# Patient Record
Sex: FEMALE | Race: OTHER | HISPANIC OR LATINO | URBAN - METROPOLITAN AREA
[De-identification: names, ages, dates, MRNs, and addresses within clinical notes are randomized per-mention and may not be internally consistent; named-entity substitution may affect disease eponyms.]

---
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
## 2025-06-28 ENCOUNTER — EMERGENCY (EMERGENCY)
Facility: HOSPITAL | Age: 20
LOS: 1 days | End: 2025-06-28
Attending: EMERGENCY MEDICINE
Payer: COMMERCIAL

## 2025-06-28 VITALS
OXYGEN SATURATION: 99 % | SYSTOLIC BLOOD PRESSURE: 126 MMHG | DIASTOLIC BLOOD PRESSURE: 85 MMHG | HEART RATE: 72 BPM | TEMPERATURE: 98 F | RESPIRATION RATE: 18 BRPM | WEIGHT: 160.94 LBS | HEIGHT: 66 IN

## 2025-06-28 VITALS
HEART RATE: 67 BPM | TEMPERATURE: 100 F | OXYGEN SATURATION: 98 % | RESPIRATION RATE: 18 BRPM | SYSTOLIC BLOOD PRESSURE: 121 MMHG | DIASTOLIC BLOOD PRESSURE: 75 MMHG

## 2025-06-28 LAB
ALBUMIN SERPL ELPH-MCNC: 3.4 G/DL — LOW (ref 3.5–5)
ALP SERPL-CCNC: 53 U/L — SIGNIFICANT CHANGE UP (ref 40–120)
ALT FLD-CCNC: 17 U/L DA — SIGNIFICANT CHANGE UP (ref 10–60)
ANION GAP SERPL CALC-SCNC: 7 MMOL/L — SIGNIFICANT CHANGE UP (ref 5–17)
AST SERPL-CCNC: 20 U/L — SIGNIFICANT CHANGE UP (ref 10–40)
BASOPHILS # BLD AUTO: 0.05 K/UL — SIGNIFICANT CHANGE UP (ref 0–0.2)
BASOPHILS NFR BLD AUTO: 0.3 % — SIGNIFICANT CHANGE UP (ref 0–2)
BILIRUB SERPL-MCNC: 0.8 MG/DL — SIGNIFICANT CHANGE UP (ref 0.2–1.2)
BUN SERPL-MCNC: 6 MG/DL — LOW (ref 7–18)
CALCIUM SERPL-MCNC: 8.7 MG/DL — SIGNIFICANT CHANGE UP (ref 8.4–10.5)
CHLORIDE SERPL-SCNC: 106 MMOL/L — SIGNIFICANT CHANGE UP (ref 96–108)
CO2 SERPL-SCNC: 24 MMOL/L — SIGNIFICANT CHANGE UP (ref 22–31)
CREAT SERPL-MCNC: 0.75 MG/DL — SIGNIFICANT CHANGE UP (ref 0.5–1.3)
EGFR: 117 ML/MIN/1.73M2 — SIGNIFICANT CHANGE UP
EGFR: 117 ML/MIN/1.73M2 — SIGNIFICANT CHANGE UP
EOSINOPHIL # BLD AUTO: 0.18 K/UL — SIGNIFICANT CHANGE UP (ref 0–0.5)
EOSINOPHIL NFR BLD AUTO: 1.1 % — SIGNIFICANT CHANGE UP (ref 0–6)
GLUCOSE SERPL-MCNC: 94 MG/DL — SIGNIFICANT CHANGE UP (ref 70–99)
HCG SERPL-ACNC: <1 MIU/ML — SIGNIFICANT CHANGE UP
HCT VFR BLD CALC: 37.3 % — SIGNIFICANT CHANGE UP (ref 34.5–45)
HGB BLD-MCNC: 12.2 G/DL — SIGNIFICANT CHANGE UP (ref 11.5–15.5)
IMM GRANULOCYTES NFR BLD AUTO: 0.3 % — SIGNIFICANT CHANGE UP (ref 0–0.9)
LYMPHOCYTES # BLD AUTO: 1.59 K/UL — SIGNIFICANT CHANGE UP (ref 1–3.3)
LYMPHOCYTES # BLD AUTO: 10.1 % — LOW (ref 13–44)
MCHC RBC-ENTMCNC: 29.1 PG — SIGNIFICANT CHANGE UP (ref 27–34)
MCHC RBC-ENTMCNC: 32.7 G/DL — SIGNIFICANT CHANGE UP (ref 32–36)
MCV RBC AUTO: 89 FL — SIGNIFICANT CHANGE UP (ref 80–100)
MONOCYTES # BLD AUTO: 1.42 K/UL — HIGH (ref 0–0.9)
MONOCYTES NFR BLD AUTO: 9 % — SIGNIFICANT CHANGE UP (ref 2–14)
NEUTROPHILS # BLD AUTO: 12.41 K/UL — HIGH (ref 1.8–7.4)
NEUTROPHILS NFR BLD AUTO: 79.2 % — HIGH (ref 43–77)
NRBC BLD AUTO-RTO: 0 /100 WBCS — SIGNIFICANT CHANGE UP (ref 0–0)
PLATELET # BLD AUTO: 271 K/UL — SIGNIFICANT CHANGE UP (ref 150–400)
POTASSIUM SERPL-MCNC: 3.8 MMOL/L — SIGNIFICANT CHANGE UP (ref 3.5–5.3)
POTASSIUM SERPL-SCNC: 3.8 MMOL/L — SIGNIFICANT CHANGE UP (ref 3.5–5.3)
PROT SERPL-MCNC: 7.1 G/DL — SIGNIFICANT CHANGE UP (ref 6–8.3)
RBC # BLD: 4.19 M/UL — SIGNIFICANT CHANGE UP (ref 3.8–5.2)
RBC # FLD: 13.6 % — SIGNIFICANT CHANGE UP (ref 10.3–14.5)
SODIUM SERPL-SCNC: 137 MMOL/L — SIGNIFICANT CHANGE UP (ref 135–145)
WBC # BLD: 15.7 K/UL — HIGH (ref 3.8–10.5)
WBC # FLD AUTO: 15.7 K/UL — HIGH (ref 3.8–10.5)

## 2025-06-28 PROCEDURE — 84702 CHORIONIC GONADOTROPIN TEST: CPT

## 2025-06-28 PROCEDURE — 36415 COLL VENOUS BLD VENIPUNCTURE: CPT

## 2025-06-28 PROCEDURE — 80053 COMPREHEN METABOLIC PANEL: CPT

## 2025-06-28 PROCEDURE — 96375 TX/PRO/DX INJ NEW DRUG ADDON: CPT | Mod: XU

## 2025-06-28 PROCEDURE — 99285 EMERGENCY DEPT VISIT HI MDM: CPT

## 2025-06-28 PROCEDURE — 85025 COMPLETE CBC W/AUTO DIFF WBC: CPT

## 2025-06-28 PROCEDURE — 99285 EMERGENCY DEPT VISIT HI MDM: CPT | Mod: 25

## 2025-06-28 PROCEDURE — 70481 CT ORBIT/EAR/FOSSA W/DYE: CPT

## 2025-06-28 PROCEDURE — 70481 CT ORBIT/EAR/FOSSA W/DYE: CPT | Mod: 26

## 2025-06-28 PROCEDURE — 96374 THER/PROPH/DIAG INJ IV PUSH: CPT | Mod: XU

## 2025-06-28 RX ORDER — PIPERACILLIN-TAZO-DEXTROSE,ISO 3.375G/5
3.38 IV SOLUTION, PIGGYBACK PREMIX FROZEN(ML) INTRAVENOUS ONCE
Refills: 0 | Status: COMPLETED | OUTPATIENT
Start: 2025-06-28 | End: 2025-06-28

## 2025-06-28 RX ORDER — KETOROLAC TROMETHAMINE 30 MG/ML
15 INJECTION, SOLUTION INTRAMUSCULAR; INTRAVENOUS ONCE
Refills: 0 | Status: DISCONTINUED | OUTPATIENT
Start: 2025-06-28 | End: 2025-06-28

## 2025-06-28 RX ADMIN — KETOROLAC TROMETHAMINE 15 MILLIGRAM(S): 30 INJECTION, SOLUTION INTRAMUSCULAR; INTRAVENOUS at 20:47

## 2025-06-28 RX ADMIN — Medication 200 GRAM(S): at 23:41

## 2025-06-28 RX ADMIN — KETOROLAC TROMETHAMINE 15 MILLIGRAM(S): 30 INJECTION, SOLUTION INTRAMUSCULAR; INTRAVENOUS at 21:17

## 2025-06-28 NOTE — ED PROVIDER NOTE - ATTENDING APP SHARED VISIT CONTRIBUTION OF CARE
19 year old F with right ear pain x 2 days.  CT reported R otitis externa and findings suggestive of right TMJ septic arthritis.  Pt required transfer to [facility] for ENT intervention  CLAUDE Hensley had a detailed discussion with the patient and/or guardian regarding the historical points, exam findings, and any diagnostic results supporting the transfer diagnosis. 19 year old F with right ear pain x 2 days.  Right ear canal edematous, + discharge, tenderness  CT reported R otitis externa and findings suggestive of right TMJ septic arthritis.  Pt required transfer to [facility] for ENT intervention  CLAUDE Hensley had a detailed discussion with the patient and/or guardian regarding the historical points, exam findings, and any diagnostic results supporting the transfer diagnosis.

## 2025-06-28 NOTE — ED ADULT NURSE NOTE - TEMPLATE LIST FOR HEAD TO TOE ASSESSMENT
EENMT Picato Counseling:  I discussed with the patient the risks of Picato including but not limited to erythema, scaling, itching, weeping, crusting, and pain.

## 2025-06-28 NOTE — ED PROVIDER NOTE - PROGRESS NOTE DETAILS
CT shows right otitis externa and also concerns for a septic joint of the right TMJ.  Spoke with ENT, Dr. Asher, via the CTC who will accept transfer to the emergency room at [facility].  Verbal consult by the [facility] to OMFS was performed, CLAUDE Hensley was not on the line at the time, may need to additionally consult in the ER upon arrival.

## 2025-06-28 NOTE — ED PROVIDER NOTE - OBJECTIVE STATEMENT
19-year-old female with no past medical history presents with right ear pain for 2 days.  Went to urgent care yesterday where she was diagnosed with otitis media and otitis externa.  Was given ciprofloxacin (has taken 3 doses) and Polytrim.  Patient reports that since then she has had worsening pain, swelling behind the ear, redness behind the ear.  In addition to the antibiotic she has been taking Tylenol, Motrin and Excedrin for pain. Denies fevers. 19-year-old female with no past medical history presents with right ear pain for 2 days.  Went to urgent care yesterday where she was diagnosed with otitis media and otitis externa.  Was given ciprofloxacin (has taken 3 doses) and Polytrim.  Patient reports that since then she has had worsening pain, swelling behind the ear, redness behind the ear. Patient reports the pain is radiating to her jaw, causing pain with chewing and moving of mouth.  In addition to the antibiotic she has been taking Tylenol, Motrin and Excedrin for pain. Denies fevers.

## 2025-06-28 NOTE — ED PROVIDER NOTE - PHYSICAL EXAMINATION
Right ear - +Swelling, erythema and TTP of the mastoid. +outpouching of the auricle. Auricle and tragal tenderness. External canal edematous with yellow discharge, unable to visualise inner ear due to swelling.     Left ear - eczema noted in canal. TM intact. No mastoid tenderness.

## 2025-06-28 NOTE — ED ADULT TRIAGE NOTE - CHIEF COMPLAINT QUOTE
Pt  reports that  she has Right  ear pain started 2 days ago . treated by [facility] with antibiotics and ear drops yesterday .   Pt c/o of severe pain  radiating in the right lower jaw  and right side head   and yellowish discharge

## 2025-06-28 NOTE — ED PROVIDER NOTE - CLINICAL SUMMARY MEDICAL DECISION MAKING FREE TEXT BOX
19-year-old female with no past medical history presents with right ear pain for 2 days.  Went to urgent care yesterday where she was diagnosed with otitis media and otitis externa.  Was given ciprofloxacin (has taken 3 doses) and Polytrim.  Patient reports that since then she has had worsening pain, swelling behind the ear, redness behind the ear.  In addition to the antibiotic she has been taking Tylenol, Motrin and Excedrin for pain. Denies fevers.     Physical exam as above. Will obtain labs and obtain CT to r/o mastoiditis. 19-year-old female with no past medical history presents with right ear pain for 2 days.  Went to urgent care yesterday where she was diagnosed with otitis media and otitis externa.  Was given ciprofloxacin (has taken 3 doses) and Polytrim.  Patient reports that since then she has had worsening pain, swelling behind the ear, redness behind the ear. Patient reports the pain is radiating to her jaw, causing pain with chewing and moving of mouth.  In addition to the antibiotic she has been taking Tylenol, Motrin and Excedrin for pain. Denies fevers.    Physical exam as above. Will obtain labs and obtain CT to r/o mastoiditis.

## 2025-06-29 ENCOUNTER — INPATIENT (INPATIENT)
Facility: HOSPITAL | Age: 20
LOS: 2 days | Discharge: HOME | End: 2025-07-02
Attending: HOSPITALIST | Admitting: HOSPITALIST
Payer: COMMERCIAL

## 2025-06-29 VITALS
HEIGHT: 66 IN | SYSTOLIC BLOOD PRESSURE: 131 MMHG | HEART RATE: 57 BPM | DIASTOLIC BLOOD PRESSURE: 83 MMHG | RESPIRATION RATE: 18 BRPM | WEIGHT: 186.07 LBS | TEMPERATURE: 98 F | OXYGEN SATURATION: 99 %

## 2025-06-29 DIAGNOSIS — E87.1 HYPO-OSMOLALITY AND HYPONATREMIA: ICD-10-CM

## 2025-06-29 DIAGNOSIS — Z29.9 ENCOUNTER FOR PROPHYLACTIC MEASURES, UNSPECIFIED: ICD-10-CM

## 2025-06-29 DIAGNOSIS — H66.90 OTITIS MEDIA, UNSPECIFIED, UNSPECIFIED EAR: ICD-10-CM

## 2025-06-29 DIAGNOSIS — M26.69 OTHER SPECIFIED DISORDERS OF TEMPOROMANDIBULAR JOINT: ICD-10-CM

## 2025-06-29 DIAGNOSIS — H60.90 UNSPECIFIED OTITIS EXTERNA, UNSPECIFIED EAR: ICD-10-CM

## 2025-06-29 LAB
ALBUMIN SERPL ELPH-MCNC: 3.4 G/DL — SIGNIFICANT CHANGE UP (ref 3.3–5)
ALP SERPL-CCNC: 50 U/L — SIGNIFICANT CHANGE UP (ref 40–120)
ALT FLD-CCNC: 10 U/L — SIGNIFICANT CHANGE UP (ref 4–33)
ANION GAP SERPL CALC-SCNC: 13 MMOL/L — SIGNIFICANT CHANGE UP (ref 7–14)
ANION GAP SERPL CALC-SCNC: 14 MMOL/L — SIGNIFICANT CHANGE UP (ref 7–14)
AST SERPL-CCNC: 16 U/L — SIGNIFICANT CHANGE UP (ref 4–32)
BASOPHILS # BLD AUTO: 0.04 K/UL — SIGNIFICANT CHANGE UP (ref 0–0.2)
BASOPHILS NFR BLD AUTO: 0.3 % — SIGNIFICANT CHANGE UP (ref 0–2)
BILIRUB SERPL-MCNC: 0.6 MG/DL — SIGNIFICANT CHANGE UP (ref 0.2–1.2)
BUN SERPL-MCNC: 6 MG/DL — LOW (ref 7–23)
BUN SERPL-MCNC: 7 MG/DL — SIGNIFICANT CHANGE UP (ref 7–23)
CALCIUM SERPL-MCNC: 7.7 MG/DL — LOW (ref 8.4–10.5)
CALCIUM SERPL-MCNC: 8.6 MG/DL — SIGNIFICANT CHANGE UP (ref 8.4–10.5)
CHLORIDE SERPL-SCNC: 107 MMOL/L — SIGNIFICANT CHANGE UP (ref 98–107)
CHLORIDE SERPL-SCNC: 94 MMOL/L — LOW (ref 98–107)
CO2 SERPL-SCNC: 16 MMOL/L — LOW (ref 22–31)
CO2 SERPL-SCNC: 17 MMOL/L — LOW (ref 22–31)
CREAT SERPL-MCNC: 0.58 MG/DL — SIGNIFICANT CHANGE UP (ref 0.5–1.3)
CREAT SERPL-MCNC: 0.7 MG/DL — SIGNIFICANT CHANGE UP (ref 0.5–1.3)
CRP SERPL-MCNC: 52.9 MG/L — HIGH
EGFR: 127 ML/MIN/1.73M2 — SIGNIFICANT CHANGE UP
EGFR: 127 ML/MIN/1.73M2 — SIGNIFICANT CHANGE UP
EGFR: 133 ML/MIN/1.73M2 — SIGNIFICANT CHANGE UP
EGFR: 133 ML/MIN/1.73M2 — SIGNIFICANT CHANGE UP
EOSINOPHIL # BLD AUTO: 0.27 K/UL — SIGNIFICANT CHANGE UP (ref 0–0.5)
EOSINOPHIL NFR BLD AUTO: 1.9 % — SIGNIFICANT CHANGE UP (ref 0–6)
ERYTHROCYTE [SEDIMENTATION RATE] IN BLOOD: 34 MM/HR — HIGH (ref 0–20)
GLUCOSE SERPL-MCNC: 67 MG/DL — LOW (ref 70–99)
GLUCOSE SERPL-MCNC: 89 MG/DL — SIGNIFICANT CHANGE UP (ref 70–99)
HCT VFR BLD CALC: 34.7 % — SIGNIFICANT CHANGE UP (ref 34.5–45)
HGB BLD-MCNC: 11.4 G/DL — LOW (ref 11.5–15.5)
IMM GRANULOCYTES # BLD AUTO: 0.04 K/UL — SIGNIFICANT CHANGE UP (ref 0–0.07)
IMM GRANULOCYTES NFR BLD AUTO: 0.3 % — SIGNIFICANT CHANGE UP (ref 0–0.9)
LYMPHOCYTES # BLD AUTO: 1.83 K/UL — SIGNIFICANT CHANGE UP (ref 1–3.3)
LYMPHOCYTES NFR BLD AUTO: 12.6 % — LOW (ref 13–44)
MCHC RBC-ENTMCNC: 28.7 PG — SIGNIFICANT CHANGE UP (ref 27–34)
MCHC RBC-ENTMCNC: 32.9 G/DL — SIGNIFICANT CHANGE UP (ref 32–36)
MCV RBC AUTO: 87.4 FL — SIGNIFICANT CHANGE UP (ref 80–100)
MONOCYTES # BLD AUTO: 1.16 K/UL — HIGH (ref 0–0.9)
MONOCYTES NFR BLD AUTO: 8 % — SIGNIFICANT CHANGE UP (ref 2–14)
MRSA PCR RESULT.: SIGNIFICANT CHANGE UP
NEUTROPHILS # BLD AUTO: 11.18 K/UL — HIGH (ref 1.8–7.4)
NEUTROPHILS NFR BLD AUTO: 76.9 % — SIGNIFICANT CHANGE UP (ref 43–77)
NRBC # BLD AUTO: 0 K/UL — SIGNIFICANT CHANGE UP (ref 0–0)
NRBC # FLD: 0 K/UL — SIGNIFICANT CHANGE UP (ref 0–0)
NRBC BLD AUTO-RTO: 0 /100 WBCS — SIGNIFICANT CHANGE UP (ref 0–0)
PLATELET # BLD AUTO: 279 K/UL — SIGNIFICANT CHANGE UP (ref 150–400)
PMV BLD: 11 FL — SIGNIFICANT CHANGE UP (ref 7–13)
POTASSIUM SERPL-MCNC: 3.5 MMOL/L — SIGNIFICANT CHANGE UP (ref 3.5–5.3)
POTASSIUM SERPL-MCNC: 4.2 MMOL/L — SIGNIFICANT CHANGE UP (ref 3.5–5.3)
POTASSIUM SERPL-SCNC: 3.5 MMOL/L — SIGNIFICANT CHANGE UP (ref 3.5–5.3)
POTASSIUM SERPL-SCNC: 4.2 MMOL/L — SIGNIFICANT CHANGE UP (ref 3.5–5.3)
PROT SERPL-MCNC: 6.3 G/DL — SIGNIFICANT CHANGE UP (ref 6–8.3)
RBC # BLD: 3.97 M/UL — SIGNIFICANT CHANGE UP (ref 3.8–5.2)
RBC # FLD: 13.6 % — SIGNIFICANT CHANGE UP (ref 10.3–14.5)
S AUREUS DNA NOSE QL NAA+PROBE: SIGNIFICANT CHANGE UP
SODIUM SERPL-SCNC: 124 MMOL/L — LOW (ref 135–145)
SODIUM SERPL-SCNC: 137 MMOL/L — SIGNIFICANT CHANGE UP (ref 135–145)
WBC # BLD: 14.52 K/UL — HIGH (ref 3.8–10.5)
WBC # FLD AUTO: 14.52 K/UL — HIGH (ref 3.8–10.5)

## 2025-06-29 PROCEDURE — 99223 1ST HOSP IP/OBS HIGH 75: CPT

## 2025-06-29 PROCEDURE — 70487 CT MAXILLOFACIAL W/DYE: CPT | Mod: 26

## 2025-06-29 PROCEDURE — 99285 EMERGENCY DEPT VISIT HI MDM: CPT | Mod: 25

## 2025-06-29 RX ORDER — KETOROLAC TROMETHAMINE 30 MG/ML
15 INJECTION, SOLUTION INTRAMUSCULAR; INTRAVENOUS EVERY 6 HOURS
Refills: 0 | Status: DISCONTINUED | OUTPATIENT
Start: 2025-06-29 | End: 2025-07-01

## 2025-06-29 RX ORDER — LORAZEPAM 4 MG/ML
1 VIAL (ML) INJECTION ONCE
Refills: 0 | Status: DISCONTINUED | OUTPATIENT
Start: 2025-06-29 | End: 2025-06-29

## 2025-06-29 RX ORDER — NEOMYCIN SULFATE 87.5 MG/5ML
0 SOLUTION ORAL
Refills: 0 | DISCHARGE

## 2025-06-29 RX ORDER — PIPERACILLIN-TAZO-DEXTROSE,ISO 3.375G/5
3.38 IV SOLUTION, PIGGYBACK PREMIX FROZEN(ML) INTRAVENOUS ONCE
Refills: 0 | Status: COMPLETED | OUTPATIENT
Start: 2025-06-29 | End: 2025-06-29

## 2025-06-29 RX ORDER — ONDANSETRON HCL/PF 4 MG/2 ML
4 VIAL (ML) INJECTION EVERY 8 HOURS
Refills: 0 | Status: DISCONTINUED | OUTPATIENT
Start: 2025-06-29 | End: 2025-07-02

## 2025-06-29 RX ORDER — ACETAMINOPHEN 500 MG/5ML
650 LIQUID (ML) ORAL EVERY 6 HOURS
Refills: 0 | Status: DISCONTINUED | OUTPATIENT
Start: 2025-06-29 | End: 2025-07-02

## 2025-06-29 RX ORDER — ACETAMINOPHEN 500 MG/5ML
1000 LIQUID (ML) ORAL ONCE
Refills: 0 | Status: COMPLETED | OUTPATIENT
Start: 2025-06-29 | End: 2025-06-29

## 2025-06-29 RX ORDER — KETOROLAC TROMETHAMINE 30 MG/ML
15 INJECTION, SOLUTION INTRAMUSCULAR; INTRAVENOUS ONCE
Refills: 0 | Status: DISCONTINUED | OUTPATIENT
Start: 2025-06-29 | End: 2025-06-29

## 2025-06-29 RX ORDER — MAGNESIUM, ALUMINUM HYDROXIDE 200-200 MG
30 TABLET,CHEWABLE ORAL EVERY 4 HOURS
Refills: 0 | Status: DISCONTINUED | OUTPATIENT
Start: 2025-06-29 | End: 2025-07-02

## 2025-06-29 RX ORDER — HYDROMORPHONE/SOD CHLOR,ISO/PF 2 MG/10 ML
0.5 SYRINGE (ML) INJECTION ONCE
Refills: 0 | Status: DISCONTINUED | OUTPATIENT
Start: 2025-06-29 | End: 2025-06-29

## 2025-06-29 RX ORDER — MELATONIN 5 MG
3 TABLET ORAL AT BEDTIME
Refills: 0 | Status: DISCONTINUED | OUTPATIENT
Start: 2025-06-29 | End: 2025-07-02

## 2025-06-29 RX ORDER — PIPERACILLIN-TAZO-DEXTROSE,ISO 3.375G/5
3.38 IV SOLUTION, PIGGYBACK PREMIX FROZEN(ML) INTRAVENOUS EVERY 8 HOURS
Refills: 0 | Status: DISCONTINUED | OUTPATIENT
Start: 2025-06-29 | End: 2025-07-02

## 2025-06-29 RX ADMIN — Medication 1000 MILLILITER(S): at 12:23

## 2025-06-29 RX ADMIN — Medication 1000 MILLIGRAM(S): at 09:26

## 2025-06-29 RX ADMIN — Medication 400 MILLIGRAM(S): at 21:11

## 2025-06-29 RX ADMIN — Medication 0.5 MILLIGRAM(S): at 13:30

## 2025-06-29 RX ADMIN — Medication 5 DROP(S): at 17:46

## 2025-06-29 RX ADMIN — Medication 25 GRAM(S): at 17:41

## 2025-06-29 RX ADMIN — Medication 400 MILLIGRAM(S): at 09:22

## 2025-06-29 RX ADMIN — Medication 200 GRAM(S): at 06:33

## 2025-06-29 RX ADMIN — KETOROLAC TROMETHAMINE 15 MILLIGRAM(S): 30 INJECTION, SOLUTION INTRAMUSCULAR; INTRAVENOUS at 06:22

## 2025-06-29 RX ADMIN — Medication 0.5 MILLIGRAM(S): at 13:47

## 2025-06-29 RX ADMIN — KETOROLAC TROMETHAMINE 15 MILLIGRAM(S): 30 INJECTION, SOLUTION INTRAMUSCULAR; INTRAVENOUS at 15:55

## 2025-06-29 RX ADMIN — KETOROLAC TROMETHAMINE 15 MILLIGRAM(S): 30 INJECTION, SOLUTION INTRAMUSCULAR; INTRAVENOUS at 04:24

## 2025-06-29 RX ADMIN — Medication 1000 MILLIGRAM(S): at 22:11

## 2025-06-29 NOTE — H&P ADULT - NSHPLABSRESULTS_GEN_ALL_CORE
LABS:                        11.4   14.52 )-----------( 279      ( 29 Jun 2025 12:37 )             34.7     06-29    137  |  107  |  6[L]  ----------------------------<  89  4.2   |  17[L]  |  0.58    Ca    8.6      29 Jun 2025 16:38    TPro  6.3  /  Alb  3.4  /  TBili  0.6  /  DBili  x   /  AST  16  /  ALT  10  /  AlkPhos  50  06-29 06-29 @ 12:37 ESR 34 / CRP 52.9      CT IAC  IMPRESSION:  CT findings consistent with right otitis externa.  No discrete abscess or   drainable fluid collection is identified by CT technique.    There is asymmetric subluxation of the right temporomandibular joint with   asymmetric hyperattenuation in the right temporomandibular joint, which   raises the possibility of septic arthritis.    No gross CT evidence for intracranial spread of infection.    Follow-up contrast enhanced MRI may be obtained for more sensitive   evaluation.    CT maxillofacial     1. Redemonstration of right-sided otitis externa and right-sided   otomastoiditis. No associated bony or ossicular chain destruction.  2. Localized inflammatory changes tracking into the right TMJ space   without bony destruction. Right-sided TMJ septic arthritis cannot be   excluded.  3. Reactive right-sided parotiditis.  4. Reactively enlarged bilateral cervical lymph nodes.

## 2025-06-29 NOTE — ED ADULT NURSE REASSESSMENT NOTE - NS ED NURSE REASSESS COMMENT FT1
Pt is A+O4, resting in stretcher. Spontaneous respirations are even and unlabored on room air, no acute distress noted. Pt denies pain at this time. Awaiting transport.

## 2025-06-29 NOTE — ED ADULT NURSE REASSESSMENT NOTE - NS ED NURSE REASSESS COMMENT FT1
received report from nite shift/  pt seen by ent  pt to have repeat cat  scan  pt given pain med as ordered  family at bedside

## 2025-06-29 NOTE — H&P ADULT - PROBLEM SELECTOR PLAN 2
r/o septic TMJ joint  - ESR 34, CRP 52  - f/u blood cx (s/p zosyn)  - check HIV, GC chlamydia   - c/w zosyn  - MRI TMJ pending (advised pt will need to remove piercing, amenable), called rads resident and MRI tech advised want w/ contrast, rads resident to protocol w/ contrast as order does not specify  - soft diet

## 2025-06-29 NOTE — ED ADULT TRIAGE NOTE - CHIEF COMPLAINT QUOTE
[facility]. c/o R sided jaw pain x 4 days. CT+ for R TMJ septic arthritis. denies Hx

## 2025-06-29 NOTE — ED PROVIDER NOTE - PROGRESS NOTE DETAILS
Patient is due for second dose of antibiotics and was given Zosyn while awaiting ENT evaluation.  Patient required more pain medication.  Patient assessed after and pain has improved after second dose.  ENT consulted again at 6 AM and they should be arriving shortly. Attending MD Gonzalez.  Pt signed out to me in stable condition pending ENT recs, 20 yo fem with otitis externa/media 2d ago, on abxs with persistent severe pain/swelling, CT ext aud with sig ext otitis externa, poss septic arthritis of R TMJ. Jono Galicia PGY3 MD  Spoke with OMFS.  At this time they are recommending admission for IV antibiotics.  Recommends medicine admission.  Possible MRI.  No acute surgical intervention at this time.

## 2025-06-29 NOTE — H&P ADULT - HISTORY OF PRESENT ILLNESS
19F smoker no medical history, prior otitis externa who presents w/ worsening R ear pain.  Patient reports went to beach on 6/23, swimming, water did get into ear.  On 6/25 noted R ear was a bit bothersome, got OTC ear meds at local pharmacy, by 6/26 pain was worsening, with drainage and swelling and issues with mouth movement, pain, limiting PO.  On 6/27 went to urgent care given neomycin/polymyxin/hydrocort and PO cipro took all of Friday and Sat am.  Friday with abx increased nausea/vomiting unable to keep PO down.  On 6/28 pain worsening and presented to ED at  and noted to have otitis externa and on CT concern for septic TMJ transferred to [facility] for OMFS and ENT eval.    ENT saw-->ciprodex 5 gtt BID x 10 d  OMFS--> abx and MRI TMJ    AVSS, labs with leukocytosis.

## 2025-06-29 NOTE — CONSULT NOTE ADULT - ASSESSMENT
19F w/ no pmhx presents as a transfer from [facility] ED to [facility] for ENT eval. Repeat max face shows Patient with otitis media and otitis externa, repeat w/  localizing inflammatory changes that tracks into the right TMJ space, no bony destruction or collection appreciated.     Plan:   In discussion w/ attending 19F w/ no pmhx [facility] for ENT eval. Repeat max face shows Patient with otitis media and otitis externa, repeat w/  localizing inflammatory changes that tracks into the right TMJ space, no bony destruction or collection appreciated.     Plan:   No acute OMFS intervention  Blood culture   STD panel   C/w IV abx   F/u ENT reccs  Recc Medicine admission for leukocytosis and IV abx  Recc MRI to eval TMJ

## 2025-06-29 NOTE — H&P ADULT - NSHPPHYSICALEXAM_GEN_ALL_CORE
T(C): 37.4 (06-29-25 @ 16:08), Max: 37.8 (06-28-25 @ 23:33)  HR: 62 (06-29-25 @ 16:08) (52 - 72)  BP: 144/69 (06-29-25 @ 16:08) (111/68 - 144/69)  RR: 17 (06-29-25 @ 16:08) (16 - 18)  SpO2: 100% (06-29-25 @ 16:08) (98% - 100%)    PHYSICAL EXAM:  GENERAL: NAD, well-developed  HEAD:  Atraumatic, Normocephalic  HEENT: R ear drainage, TTP of mastoid and TMJ  EYES: EOMI, PERRLA, conjunctiva and sclera clear  NECK: Supple, no JVD  CHEST/LUNG: Clear to auscultation bilaterally; no wheeze  HEART: Regular rate and rhythm; no murmurs, rubs, or gallops  ABDOMEN: Soft, Nontender, Nondistended; Bowel sounds present  EXTREMITIES:  warm and well perfused, no clubbing, cyanosis, or edema  PSYCH: AAOx3  NEUROLOGY: non-focal  SKIN: no rashes or lesions

## 2025-06-29 NOTE — ED PROVIDER NOTE - CLINICAL SUMMARY MEDICAL DECISION MAKING FREE TEXT BOX
19-year-old female with otitis externa and possible septic arthritis of the TMJ questionable mastoiditis on CT.  Patient has improved pain after antibiotics.  Will continue to monitor vital signs and ENT consult is pending. ENT called at 2:15 AM.

## 2025-06-29 NOTE — ED ADULT NURSE NOTE - CHIEF COMPLAINT QUOTE
arrives as transfer from [facility] for ENT consult. c/o R sided jaw pain x 4 days. CT+ for R TMJ septic arthritis. denies Hx

## 2025-06-29 NOTE — ED ADULT TRIAGE NOTE - HEIGHT IN INCHES
"Encounter Date: 2/15/2020    SCRIBE #1 NOTE: I, Jeanine Boggs, am scribing for, and in the presence of,  Dr. Ahumada. I have scribed the following portions of the note - Other sections scribed: HPI, ROS, PE.       History     Chief Complaint   Patient presents with    Nausea     nausea, abdominal pain, chills that started today.     Abdominal Pain     Diana Arriaga is a 45 y.o. female with osteopenia, interstitial cystitis, and anxiety and with history of migraine headaches who presents to the ED complaining of nausea and throbbing, spasms, tightening abdominal pain that started toady.  Patient is also complains of dysuria and thick, white, liquid-like vaginal discharge that started yesterday. Patient is allergic to robaxin, ciprofloxacin, trazodone, Zofran, vistaril, and adhesive.  No recent sexual intercourse.  Last seen by OB/GYN in December. Patient has had several surgeries in the past year and is currently wearing a post-op garment.    The history is provided by the patient. No  was used.     Review of patient's allergies indicates:   Allergen Reactions    Robaxin [methocarbamol] Anxiety and Other (See Comments)     States "feels like I have creepy crawlers down my legs "    Ciprofloxacin Itching    Trazodone Anxiety     Nightmares, restless leg, aggitation    Zofran [ondansetron hcl (pf)] Itching    Adhesive Blisters     Clear/Silicone tape. Caused scarring to skin.    Vistaril [hydroxyzine hcl]      Creepy crawling in legs, restless legs      Past Medical History:   Diagnosis Date    Anxiety     Back pain     Cystitis     interstitial cystitis    Depression     Migraine headache     Osteopenia      Past Surgical History:   Procedure Laterality Date    APPENDECTOMY      BILATERAL MASTECTOMY Bilateral 3/25/2019    Procedure: MASTECTOMY, BILATERAL;  Surgeon: Ivonne Flower MD;  Location: [facility];  Service: Plastics;  Laterality: Bilateral;    BREAST BIOPSY Left 2016    " fibroadenoma    breast cyst removed      Lt breast    BREAST REVISION SURGERY Right 3/28/2019    Procedure: BREAST REVISION SURGERY;  Surgeon: Greyson Tidwell MD;  Location: [facility];  Service: Plastics;  Laterality: Right;     SECTION  1990, 1993    x2    CYSTOSCOPY WITH HYDRODISTENSION OF BLADDER N/A 3/8/2019    Procedure: CYSTOSCOPY, WITH BLADDER HYDRODISTENSION;  Surgeon: EDDIE Matias MD;  Location: [facility] OR;  Service: Urology;  Laterality: N/A;  RN PHONE PREOP 3/1/19-----CBC, BMP    hydrodistention      interstitial cystitis    HYSTERECTOMY      heavy periods, endometriosis, benign reasons    INSERTION OF BREAST IMPLANT Right 2020    Procedure: INSERTION, BREAST IMPLANT;  Surgeon: Greyson Tidwell MD;  Location: [facility] OR [address] [place] [address];  Service: Plastics;  Laterality: Right;    INSERTION OF BREAST TISSUE EXPANDER Right 2019    Procedure: INSERTION, TISSUE EXPANDER, BREAST;  Surgeon: Greyson Tidwell MD;  [facility];  Service: Plastics;  Laterality: Right;  [id number] x 2  [id number] x 2    INTERNAL NEUROLYSIS USING OPERATING MICROSCOPE  3/26/2019    Procedure: INTERNAL, USING OPERATING MICROSCOPE;  Surgeon: Greyson Tidwell MD;  Location: [facility] OR;  Service: Plastics;;    LASER LAPAROSCOPY      x2    LIPOSUCTION W/ FAT INJECTION N/A 2020    Procedure: LIPOSUCTION, WITH FAT TRANSFER;  Surgeon: Greyson Tidwell MD;  [facility];  Service: Plastics;  Laterality: N/A;    OOPHORECTOMY      RECONSTRUCTION OF BREAST WITH DEEP INFERIOR EPIGASTRIC ARTERY  (MAICO) FREE FLAP Bilateral 3/25/2019    Procedure: RECONSTRUCTION, BREAST, USING MAICO FREE FLAP;  Surgeon: Greyson Tidwell MD;  Location: [facility];  Service: Plastics;  Laterality: Bilateral;  Bilateral prophylactic mastectomy with recon. Please add Dr. Bryan Kaye to the case.      REPLACEMENT OF IMPLANT OF BREAST Right 2020    Procedure: REPLACEMENT, IMPLANT, BREAST;  Surgeon: Greyson  MD Yaw;  Location: [facility] OR 2ND FLR;  Service: Plastics;  Laterality: Right;    REVISION OF SCAR  2020    Procedure: REVISION, SCAR;  Surgeon: Greyson Tidwell MD;  Location: [facility] OR 2ND FLR;  Service: Plastics;;    THROMBECTOMY Right 3/26/2019    Procedure: THROMBECTOMY;  Surgeon: Greyson Tidwell MD;  Location: [facility] OR;  Service: Plastics;  Laterality: Right;    TOTAL REDUCTION MAMMOPLASTY Left 2020    Procedure: MAMMOPLASTY, REDUCTION;  Surgeon: Greyson Tidwell MD;  Location: [facility] OR 2ND FLR;  Service: Plastics;  Laterality: Left;     Family History   Problem Relation Age of Onset    Cancer Mother 60        breast    Diabetes Mother     Breast cancer Mother     Diabetes Maternal Grandmother     Cancer Maternal Grandmother         lung    Stroke Maternal Grandfather     Heart disease Paternal Grandfather     Cancer Sister 40        ovarian    Diabetes Sister     Heart disease Sister     Kidney disease Sister     Ovarian cancer Sister     Cancer Maternal Aunt         laryngeal    Ovarian cancer Paternal Aunt     Breast cancer Other     Breast cancer Other     Breast cancer Other      Social History     Tobacco Use    Smoking status: Former Smoker     Packs/day: 0.25     Years: 25.00     Pack years: 6.25     Last attempt to quit: 2018     Years since quittin.1    Smokeless tobacco: Never Used   Substance Use Topics    Alcohol use: Yes     Comment: social    Drug use: No     Review of Systems   Constitutional: Positive for chills.   Respiratory: Negative for shortness of breath.    Gastrointestinal: Positive for abdominal pain and nausea.   Genitourinary: Positive for dysuria and vaginal discharge. Negative for hematuria.   Musculoskeletal: Negative for back pain.   All other systems reviewed and are negative.      Physical Exam     Initial Vitals [02/15/20 2040]   BP Pulse Resp Temp SpO2   (!) 108/47 67 18 97.9 °F (36.6 °C) 99 %      MAP       --         Physical  Exam    Nursing note and vitals reviewed.  Constitutional: She appears well-developed and well-nourished.   HENT:   Head: Normocephalic and atraumatic.   Eyes: Conjunctivae are normal.   Neck: Normal range of motion and phonation normal. Neck supple.   Cardiovascular: Normal rate, regular rhythm, S1 normal, S2 normal, normal heart sounds and intact distal pulses. Exam reveals no gallop and no friction rub.    No murmur heard.  Pulmonary/Chest: Effort normal and breath sounds normal. No stridor. No respiratory distress. She has no decreased breath sounds. She has no wheezes. She has no rhonchi. She has no rales.   Abdominal: Normal appearance.   Exam limited due post-op garments which patient does not wish to remove and patient refuses to allow abdominal palpation due to recent surgery   Musculoskeletal: Normal range of motion.   Neurological: She is alert and oriented to person, place, and time.   Skin: Skin is warm and dry. Capillary refill takes less than 2 seconds. No rash noted.   Psychiatric: She has a normal mood and affect. Her behavior is normal.         ED Course   Procedures  Labs Reviewed   POCT URINALYSIS W/O SCOPE - Abnormal; Notable for the following components:       Result Value    Spec Grav UA >=1.030 (*)     Blood, UA Trace-intact (*)     Leukocytes, UA Trace (*)     All other components within normal limits   ISTAT PROCEDURE - Abnormal; Notable for the following components:    POC PH 7.349 (*)     POC PO2 31 (*)     POC SATURATED O2 56 (*)     All other components within normal limits   POCT URINALYSIS W/O SCOPE   POCT CBC   POCT CMP   POCT LIVER PANEL   POCT LIVER PANEL   POCT CMP          Imaging Results    None          Medical Decision Making:   History:   Old Medical Records: I decided to obtain old medical records.  Clinical Tests:   Lab Tests: Ordered and Reviewed    Labs Reviewed  Admission on 02/15/2020, Discharged on 02/15/2020   Component Date Value Ref Range Status    Glucose, UA  02/15/2020 Negative   Final    Bilirubin, UA 02/15/2020 Negative   Final    Ketones, UA 02/15/2020 Negative   Final    Spec Grav UA 02/15/2020 >=1.030*  Final    Blood, UA 02/15/2020 Trace-intact*  Final    PH, UA 02/15/2020 6.0   Final    Protein, UA 02/15/2020 Negative   Final    Urobilinogen, UA 02/15/2020 0.2  E.U./dL Final    Nitrite, UA 02/15/2020 Negative   Final    Leukocytes, UA 02/15/2020 Trace*  Final    Color, UA 02/15/2020 Yellow   Final    Clarity, UA 02/15/2020 Clear   Final    POC PH 02/15/2020 7.349* 7.35 - 7.45 Final    POC PCO2 02/15/2020 45.0  35 - 45 mmHg Final    POC PO2 02/15/2020 31* 40 - 60 mmHg Final    POC HCO3 02/15/2020 24.8  24 - 28 mmol/L Final    POC BE 02/15/2020 -1  -2 to 2 mmol/L Final    POC SATURATED O2 02/15/2020 56* 95 - 100 % Final    POC Lactate 02/15/2020 1.26  0.5 - 2.2 mmol/L Final    POC TCO2 02/15/2020 26  24 - 29 mmol/L Final    Sample 02/15/2020 VENOUS   Final    Site 02/15/2020 Other   Final    Allens Test 02/15/2020 N/A   Final    Albumin, POC 02/15/2020 3.4  3.3 - 5.5 g/dL Final    Alkaline Phosphatase, POC 02/15/2020 76  42 - 141 U/L Final    ALT (SGPT), POC 02/15/2020 19  10 - 47 U/L Final    Amylase, POC 02/15/2020 53  14 - 97 U/L Final    AST (SGOT), POC 02/15/2020 24  11 - 38 U/L Final    POC GGT 02/15/2020 14  5 - 65 U/L Final    Bilirubin 02/15/2020 0.4  0.2 - 1.6 mg/dL Final    Protein 02/15/2020 6.5  6.4 - 8.1 g/dL Final    Albumin, POC 02/15/2020 3.4  3.3 - 5.5 g/dL Final    Alkaline Phosphatase, POC 02/15/2020 78  42 - 141 U/L Final    ALT (SGPT), POC 02/15/2020 16  10 - 47 U/L Final    AST (SGOT), POC 02/15/2020 23  11 - 38 U/L Final    POC BUN 02/15/2020 14  7 - 22 mg/dL Final    Calcium, POC 02/15/2020 9.1  8 - 10.3 mg/dL Final    POC Chloride 02/15/2020 106  98 - 108 mmol/L Final    POC Creatinine 02/15/2020 0.9  0.6 - 1.2 mg/dL Final    POC Glucose 02/15/2020 116  73 - 118 mg/dL Final    POC Potassium  02/15/2020 3.9  3.6 - 5.1 mmol/L Final    POC Sodium 02/15/2020 144  128 - 145 mmol/L Final    Bilirubin 02/15/2020 0.5  0.2 - 1.6 mg/dL Final    POC TCO2 02/15/2020 29  18 - 33 mmol/L Final    Protein 02/15/2020 6.6  6.4 - 8.1 g/dL Final            Imaging Reviewed    Imaging Results    None         Medications given in ED    Medications   prochlorperazine injection Soln 10 mg (10 mg Intravenous Given 2/15/20 2150)   ketorolac injection 30 mg (30 mg Intravenous Given 2/15/20 2148)   phenazopyridine tablet 100 mg (100 mg Oral Given 2/15/20 2147)       This document was produced by a scribe under my direction and in my presence. I agree with the content of the note and have made any necessary edits.     Demetri Ahumada MD         Note was created using voice recognition software. Note may have occasional typographical errors that may not have been identified and edited despite good giselle initial review prior to signing.            Scribe Attestation:   Scribe #1: I performed the above scribed service and the documentation accurately describes the services I performed. I attest to the accuracy of the note.             ED Course as of Feb 16 0633   Sat Feb 15, 2020   2226 Pain resolved with meds given    [DL]      ED Course User Index  [DL] Demetri Ahumada MD          Discharge Medications     Discharge Medication List as of 2/15/2020 10:29 PM      START taking these medications    Details   metroNIDAZOLE (FLAGYL) 500 MG tablet Take 1 tablet (500 mg total) by mouth every 12 (twelve) hours. DO NOT DRINK ALCOHOL WHILE TAKING THIS MEDICATION for 7 days, Starting Sat 2/15/2020, Until Sat 2/22/2020, Print      phenazopyridine (PYRIDIUM) 200 MG tablet Take 1 tablet (200 mg total) by mouth 3 (three) times daily. for 2 days, Starting Sat 2/15/2020, Until Mon 2/17/2020, Print         CONTINUE these medications which have NOT CHANGED    Details   CALCIUM/D3/MAG OX//MALDONADO/ZN (CALTRATE + D3 PLUS MINERALS ORAL) Take 1 tablet by  mouth once daily., Historical Med      clonazePAM (KLONOPIN) 2 MG Tab TAKE 1 TABLET BY MOUTH TWICE A DAY AS NEEDED ANXIETY, Historical Med      docusate sodium (COLACE) 100 MG capsule Take 1 capsule (100 mg total) by mouth 2 (two) times daily., Starting Sat 1/25/2020, Print      escitalopram oxalate (LEXAPRO) 20 MG tablet Take 20 mg by mouth once daily., Until Discontinued, Historical Med      !! gabapentin (NEURONTIN) 300 MG capsule Take 1 capsule (300 mg total) by mouth 3 (three) times daily., Starting Mon 11/25/2019, Until Tue 11/24/2020, Normal      !! gabapentin (NEURONTIN) 400 MG capsule TAKE 1 CAPSULE (400 MG) BY ORAL ROUTE 3 TIMES PER DAY PRN, Historical Med      ibuprofen (ADVIL,MOTRIN) 800 MG tablet Take 1 tablet (800 mg total) by mouth 3 (three) times daily., Starting Sun 2/2/2020, Normal      oxyCODONE (ROXICODONE) 5 MG immediate release tablet Take 1 tablet (5 mg total) by mouth every 6 (six) hours as needed for Pain (severe pain)., Starting Mon 2/3/2020, Normal      oxyCODONE-acetaminophen (PERCOCET) 5-325 mg per tablet Take 1 tablet by mouth every 8 (eight) hours as needed for Pain (breakthrough severe pain)., Starting Sun 2/2/2020, Normal      zolpidem (AMBIEN) 10 mg Tab Take 10 mg by mouth every evening., Starting Tue 9/25/2018, Historical Med       !! - Potential duplicate medications found. Please discuss with provider.                Patient discharged to home in stable condition with instructions to:   1. Please take all meds as prescribed.  2. Follow-up with your primary care doctor   3. Return precautions discussed and patient and/or family/caretaker understands to return to the emergency room for any concerns including worsening of your current symptoms, fever, chills, night sweats, worsening pain, chest pain, shortness of breath, nausea, vomiting, diarrhea, bleeding, headache, difficulty talking, visual disturbances, weakness, numbness or any other acute concerns        Clinical Impression:      1. Dysuria    2. Suprapubic abdominal pain    3. Vaginal discharge            Disposition:   Disposition: Discharged  Condition: Stable                     Demetri Ahumada MD  02/16/20 0618     6

## 2025-06-29 NOTE — ED PROVIDER NOTE - PHYSICAL EXAMINATION
Patient has no trismus.  Intraoral exam is unremarkable.  Next line there is significant edema of the ear canal with difficulty visualizing the tympanic membrane.  There is no pain with movement of the tragus.  There is tenderness anterior and posterior with minimal erythema around the ear.  There is mild tenderness over the mastoid area.  No skin breakdown is noted.  No significant lymphadenopathy is noted.

## 2025-06-29 NOTE — ED PROVIDER NOTE - OBJECTIVE STATEMENT
19-year-old female with no significant past medical history presents to the ED with right ear pain for few days.  Patient was diagnosed with otitis media and otitis externa and started on antibiotics and eardrops as well.  However patient noted low-grade fevers, chills and worsening pain and swelling around the ear and went to [facility].  There she had an elevated white blood cell count of 15.7 and a CT that showed "CT findings consistent with right otitis externa.  No discrete abscesses or drainable fluid collection is identified by CT technique.  There is asymmetric subluxation of the right temporomandibular joint with asymmetric hyperattenuation of the right TMJ which raises the possibility of septic arthritis."  Patient was given Zosyn and Toradol and Ofirmev by EMS.  Pain is now down to a 4 out of 10.

## 2025-06-30 LAB
-  STAPHYLOCOCCUS EPIDERMIDIS: SIGNIFICANT CHANGE UP
ANION GAP SERPL CALC-SCNC: 13 MMOL/L — SIGNIFICANT CHANGE UP (ref 7–14)
BASOPHILS # BLD AUTO: 0.05 K/UL — SIGNIFICANT CHANGE UP (ref 0–0.2)
BASOPHILS NFR BLD AUTO: 0.4 % — SIGNIFICANT CHANGE UP (ref 0–2)
BUN SERPL-MCNC: 7 MG/DL — SIGNIFICANT CHANGE UP (ref 7–23)
CALCIUM SERPL-MCNC: 9.4 MG/DL — SIGNIFICANT CHANGE UP (ref 8.4–10.5)
CHLORIDE SERPL-SCNC: 105 MMOL/L — SIGNIFICANT CHANGE UP (ref 98–107)
CO2 SERPL-SCNC: 21 MMOL/L — LOW (ref 22–31)
CREAT SERPL-MCNC: 0.76 MG/DL — SIGNIFICANT CHANGE UP (ref 0.5–1.3)
EGFR: 115 ML/MIN/1.73M2 — SIGNIFICANT CHANGE UP
EGFR: 115 ML/MIN/1.73M2 — SIGNIFICANT CHANGE UP
EOSINOPHIL # BLD AUTO: 0.43 K/UL — SIGNIFICANT CHANGE UP (ref 0–0.5)
EOSINOPHIL NFR BLD AUTO: 3.7 % — SIGNIFICANT CHANGE UP (ref 0–6)
GLUCOSE SERPL-MCNC: 92 MG/DL — SIGNIFICANT CHANGE UP (ref 70–99)
GRAM STN FLD: ABNORMAL
GRAM STN FLD: SIGNIFICANT CHANGE UP
HCT VFR BLD CALC: 40 % — SIGNIFICANT CHANGE UP (ref 34.5–45)
HGB BLD-MCNC: 12.7 G/DL — SIGNIFICANT CHANGE UP (ref 11.5–15.5)
HIV 1+2 AB+HIV1 P24 AG SERPL QL IA: SIGNIFICANT CHANGE UP
IMM GRANULOCYTES # BLD AUTO: 0.06 K/UL — SIGNIFICANT CHANGE UP (ref 0–0.07)
IMM GRANULOCYTES NFR BLD AUTO: 0.5 % — SIGNIFICANT CHANGE UP (ref 0–0.9)
LYMPHOCYTES # BLD AUTO: 2.03 K/UL — SIGNIFICANT CHANGE UP (ref 1–3.3)
LYMPHOCYTES NFR BLD AUTO: 17.2 % — SIGNIFICANT CHANGE UP (ref 13–44)
MAGNESIUM SERPL-MCNC: 2 MG/DL — SIGNIFICANT CHANGE UP (ref 1.6–2.6)
MCHC RBC-ENTMCNC: 28.7 PG — SIGNIFICANT CHANGE UP (ref 27–34)
MCHC RBC-ENTMCNC: 31.8 G/DL — LOW (ref 32–36)
MCV RBC AUTO: 90.5 FL — SIGNIFICANT CHANGE UP (ref 80–100)
METHOD TYPE: SIGNIFICANT CHANGE UP
MONOCYTES # BLD AUTO: 0.8 K/UL — SIGNIFICANT CHANGE UP (ref 0–0.9)
MONOCYTES NFR BLD AUTO: 6.8 % — SIGNIFICANT CHANGE UP (ref 2–14)
NEUTROPHILS # BLD AUTO: 8.41 K/UL — HIGH (ref 1.8–7.4)
NEUTROPHILS NFR BLD AUTO: 71.4 % — SIGNIFICANT CHANGE UP (ref 43–77)
NRBC # BLD AUTO: 0 K/UL — SIGNIFICANT CHANGE UP (ref 0–0)
NRBC # FLD: 0 K/UL — SIGNIFICANT CHANGE UP (ref 0–0)
NRBC BLD AUTO-RTO: 0 /100 WBCS — SIGNIFICANT CHANGE UP (ref 0–0)
PHOSPHATE SERPL-MCNC: 3.3 MG/DL — SIGNIFICANT CHANGE UP (ref 2.5–4.5)
PLATELET # BLD AUTO: 373 K/UL — SIGNIFICANT CHANGE UP (ref 150–400)
PMV BLD: 11.4 FL — SIGNIFICANT CHANGE UP (ref 7–13)
POTASSIUM SERPL-MCNC: 4.1 MMOL/L — SIGNIFICANT CHANGE UP (ref 3.5–5.3)
POTASSIUM SERPL-SCNC: 4.1 MMOL/L — SIGNIFICANT CHANGE UP (ref 3.5–5.3)
RBC # BLD: 4.42 M/UL — SIGNIFICANT CHANGE UP (ref 3.8–5.2)
RBC # FLD: 13.7 % — SIGNIFICANT CHANGE UP (ref 10.3–14.5)
SODIUM SERPL-SCNC: 139 MMOL/L — SIGNIFICANT CHANGE UP (ref 135–145)
SPECIMEN SOURCE: SIGNIFICANT CHANGE UP
WBC # BLD: 11.78 K/UL — HIGH (ref 3.8–10.5)
WBC # FLD AUTO: 11.78 K/UL — HIGH (ref 3.8–10.5)

## 2025-06-30 PROCEDURE — 99233 SBSQ HOSP IP/OBS HIGH 50: CPT

## 2025-06-30 PROCEDURE — 70336 MAGNETIC IMAGE JAW JOINT: CPT | Mod: 26

## 2025-06-30 RX ORDER — ALPRAZOLAM 0.5 MG
0.25 TABLET, EXTENDED RELEASE 24 HR ORAL ONCE
Refills: 0 | Status: DISCONTINUED | OUTPATIENT
Start: 2025-06-30 | End: 2025-06-30

## 2025-06-30 RX ADMIN — Medication 5 DROP(S): at 18:46

## 2025-06-30 RX ADMIN — Medication 1 APPLICATION(S): at 18:49

## 2025-06-30 RX ADMIN — KETOROLAC TROMETHAMINE 15 MILLIGRAM(S): 30 INJECTION, SOLUTION INTRAMUSCULAR; INTRAVENOUS at 10:54

## 2025-06-30 RX ADMIN — Medication 25 GRAM(S): at 18:46

## 2025-06-30 RX ADMIN — KETOROLAC TROMETHAMINE 15 MILLIGRAM(S): 30 INJECTION, SOLUTION INTRAMUSCULAR; INTRAVENOUS at 16:07

## 2025-06-30 RX ADMIN — Medication 25 GRAM(S): at 02:01

## 2025-06-30 RX ADMIN — KETOROLAC TROMETHAMINE 15 MILLIGRAM(S): 30 INJECTION, SOLUTION INTRAMUSCULAR; INTRAVENOUS at 11:54

## 2025-06-30 RX ADMIN — KETOROLAC TROMETHAMINE 15 MILLIGRAM(S): 30 INJECTION, SOLUTION INTRAMUSCULAR; INTRAVENOUS at 17:07

## 2025-06-30 RX ADMIN — KETOROLAC TROMETHAMINE 15 MILLIGRAM(S): 30 INJECTION, SOLUTION INTRAMUSCULAR; INTRAVENOUS at 02:01

## 2025-06-30 RX ADMIN — KETOROLAC TROMETHAMINE 15 MILLIGRAM(S): 30 INJECTION, SOLUTION INTRAMUSCULAR; INTRAVENOUS at 03:01

## 2025-06-30 RX ADMIN — Medication 5 DROP(S): at 06:32

## 2025-06-30 RX ADMIN — Medication 25 GRAM(S): at 10:54

## 2025-06-30 RX ADMIN — Medication 0.25 MILLIGRAM(S): at 09:09

## 2025-06-30 NOTE — CHART NOTE - NSCHARTNOTEFT_GEN_A_CORE
Search Terms: Yumiko Bermeo, 2005Search Date: 06/30/2025 08:50:08 AM  The Drug Utilization Report below displays all of the controlled substance prescriptions, if any, that your patient has filled in the last twelve months. The information displayed on this report is compiled from pharmacy submissions to the Department, and accurately reflects the information as submitted by the pharmacies.    This report was requested by: Heaven Quick | Reference #: [id number]    There are no results for the search terms that you entered. Search Terms: Lucie Bermeo, 2005Search Date: 06/30/2025 11:05:12 AM    The Drug Utilization Report below displays all of the controlled substance prescriptions, if any, that your patient has filled in the last twelve months. The information displayed on this report is compiled from pharmacy submissions to the Department, and accurately reflects the information as submitted by the pharmacies.    This report was requested by: Heaven Quick | Reference #: [id number]    There are no results for the search terms that you entered.

## 2025-06-30 NOTE — PROVIDER CONTACT NOTE (CRITICAL VALUE NOTIFICATION) - BACKGROUND
(Admit Diagnosis) Otitis media  (PMH) No pertinent past medical history  (Principal DC/DX) Otitis media  (Problem/DX) Hyponatremia

## 2025-06-30 NOTE — PROGRESS NOTE ADULT - PROBLEM SELECTOR PLAN 2
r/o septic TMJ joint  - ESR 34, CRP 52  - f/u blood cx   - HIV negative  -GC chlamydia not collected  - c/w zosyn  - F/U MRI TMJ   - soft diet r/o septic TMJ joint  - ESR 34, CRP 52  - f/u blood cx   - HIV negative  -GC chlamydia not collected  - c/w zosyn  - MRI TMJ w/o contrast was negative; Will discuss with [facility] if still need MRI with contrast.   - soft diet

## 2025-06-30 NOTE — SBIRT NOTE ADULT - NSSBIRTDRGNOACTINTDET_GEN_A_CORE
Patient agreeable to complete DAST.  Pt reports using marijuana and smoking cigarettes daily.  Patient education on risks of substance use.   Pt declined referrals/resources at this time.   Pt not interested in smoking cessation.

## 2025-06-30 NOTE — PROGRESS NOTE ADULT - SUBJECTIVE AND OBJECTIVE BOX
Patient is a 19y old  Female who presents with a chief complaint of R ear pain (29 Jun 2025 17:07)      SUBJECTIVE / OVERNIGHT EVENTS:    MEDICATIONS  (STANDING):  chlorhexidine 2% Cloths 1 Application(s) Topical daily  ciprofloxacin  0.3% Ophthalmic Solution for OTIC Use 5 Drop(s) Right Ear two times a day  dexAMETHasone 0.1% Ophthalmic Solution for OTIC Use 5 Drop(s) Right Ear two times a day  piperacillin/tazobactam IVPB.. 3.375 Gram(s) IV Intermittent every 8 hours    MEDICATIONS  (PRN):  acetaminophen     Tablet .. 650 milliGRAM(s) Oral every 6 hours PRN Temp greater or equal to 38C (100.4F), Mild Pain (1 - 3)  aluminum hydroxide/magnesium hydroxide/simethicone Suspension 30 milliLiter(s) Oral every 4 hours PRN Dyspepsia  ketorolac   Injectable 15 milliGRAM(s) IV Push every 6 hours PRN moderate and severe pain  melatonin 3 milliGRAM(s) Oral at bedtime PRN Insomnia  ondansetron Injectable 4 milliGRAM(s) IV Push every 8 hours PRN Nausea and/or Vomiting      Vital Signs Last 24 Hrs  T(C): 36.7 (30 Jun 2025 09:16), Max: 37.6 (30 Jun 2025 05:22)  T(F): 98 (30 Jun 2025 09:16), Max: 99.7 (30 Jun 2025 05:22)  HR: 64 (30 Jun 2025 09:16) (62 - 85)  BP: 121/76 (30 Jun 2025 09:16) (111/68 - 144/69)  BP(mean): --  RR: 18 (30 Jun 2025 09:16) (16 - 18)  SpO2: 100% (30 Jun 2025 09:16) (97% - 100%)    Parameters below as of 30 Jun 2025 09:16  Patient On (Oxygen Delivery Method): room air      CAPILLARY BLOOD GLUCOSE        I&O's Summary      PHYSICAL EXAM:   GENERAL: NAD, well-developed  HEAD:  Atraumatic, Normocephalic, R ear drainage, TTP of mastoid and TMJ  EYES: EOMI, PERRLA, conjunctiva and sclera clear   NECK: Supple, No JVD  CHEST/LUNG: Clear to auscultation bilaterally; No wheeze  HEART: Regular rate and rhythm; No murmurs, rubs, or gallops  ABDOMEN: Soft, Nontender, Nondistended; Bowel sounds present  EXTREMITIES:  2+ Peripheral Pulses, No clubbing, cyanosis, or edema  PSYCH: AAOx3  NEUROLOGY: non-focal  SKIN: No rashes or lesions    LABS:                        12.7   11.78 )-----------( 373      ( 30 Jun 2025 06:40 )             40.0     06-30    139  |  105  |  7   ----------------------------<  92  4.1   |  21[L]  |  0.76    Ca    9.4      30 Jun 2025 06:40  Phos  3.3     06-30  Mg     2.00     06-30    TPro  6.3  /  Alb  3.4  /  TBili  0.6  /  DBili  x   /  AST  16  /  ALT  10  /  AlkPhos  50  06-29          Urinalysis Basic - ( 30 Jun 2025 06:40 )    Color: x / Appearance: x / SG: x / pH: x  Gluc: 92 mg/dL / Ketone: x  / Bili: x / Urobili: x   Blood: x / Protein: x / Nitrite: x   Leuk Esterase: x / RBC: x / WBC x   Sq Epi: x / Non Sq Epi: x / Bacteria: x        RADIOLOGY & ADDITIONAL TESTS:    Imaging Personally Reviewed:    Consultant(s) Notes Reviewed:      Care Discussed with Consultants/Other Providers:   Patient is a 19y old  Female who presents with a chief complaint of R ear pain (29 Jun 2025 17:07)      SUBJECTIVE / OVERNIGHT EVENTS:  Patient has no new complaints. Patient still c/o ear pain. drowsiness from MRI sedation. Boyfriend at bedside. Denies cp, SOB, abdominal pain, N/V/D     MEDICATIONS  (STANDING):  chlorhexidine 2% Cloths 1 Application(s) Topical daily  ciprofloxacin  0.3% Ophthalmic Solution for OTIC Use 5 Drop(s) Right Ear two times a day  dexAMETHasone 0.1% Ophthalmic Solution for OTIC Use 5 Drop(s) Right Ear two times a day  piperacillin/tazobactam IVPB.. 3.375 Gram(s) IV Intermittent every 8 hours    MEDICATIONS  (PRN):  acetaminophen     Tablet .. 650 milliGRAM(s) Oral every 6 hours PRN Temp greater or equal to 38C (100.4F), Mild Pain (1 - 3)  aluminum hydroxide/magnesium hydroxide/simethicone Suspension 30 milliLiter(s) Oral every 4 hours PRN Dyspepsia  ketorolac   Injectable 15 milliGRAM(s) IV Push every 6 hours PRN moderate and severe pain  melatonin 3 milliGRAM(s) Oral at bedtime PRN Insomnia  ondansetron Injectable 4 milliGRAM(s) IV Push every 8 hours PRN Nausea and/or Vomiting      Vital Signs Last 24 Hrs  T(C): 36.7 (30 Jun 2025 09:16), Max: 37.6 (30 Jun 2025 05:22)  T(F): 98 (30 Jun 2025 09:16), Max: 99.7 (30 Jun 2025 05:22)  HR: 64 (30 Jun 2025 09:16) (62 - 85)  BP: 121/76 (30 Jun 2025 09:16) (111/68 - 144/69)  BP(mean): --  RR: 18 (30 Jun 2025 09:16) (16 - 18)  SpO2: 100% (30 Jun 2025 09:16) (97% - 100%)    Parameters below as of 30 Jun 2025 09:16  Patient On (Oxygen Delivery Method): room air      CAPILLARY BLOOD GLUCOSE        I&O's Summary      PHYSICAL EXAM:   GENERAL: NAD, well-developed  HEAD:  Atraumatic, Normocephalic, R ear drainage, TTP of mastoid and TMJ  EYES: EOMI, PERRLA, conjunctiva and sclera clear   NECK: Supple, No JVD  CHEST/LUNG: Clear to auscultation bilaterally; No wheeze  HEART: Regular rate and rhythm; No murmurs, rubs, or gallops  ABDOMEN: Soft, Nontender, Nondistended; Bowel sounds present  EXTREMITIES:  2+ Peripheral Pulses, No clubbing, cyanosis, or edema  PSYCH: AAOx3  NEUROLOGY: non-focal  SKIN: No rashes or lesions    LABS:                        12.7   11.78 )-----------( 373      ( 30 Jun 2025 06:40 )             40.0     06-30    139  |  105  |  7   ----------------------------<  92  4.1   |  21[L]  |  0.76    Ca    9.4      30 Jun 2025 06:40  Phos  3.3     06-30  Mg     2.00     06-30    TPro  6.3  /  Alb  3.4  /  TBili  0.6  /  DBili  x   /  AST  16  /  ALT  10  /  AlkPhos  50  06-29          Urinalysis Basic - ( 30 Jun 2025 06:40 )    Color: x / Appearance: x / SG: x / pH: x  Gluc: 92 mg/dL / Ketone: x  / Bili: x / Urobili: x   Blood: x / Protein: x / Nitrite: x   Leuk Esterase: x / RBC: x / WBC x   Sq Epi: x / Non Sq Epi: x / Bacteria: x        RADIOLOGY & ADDITIONAL TESTS:    Imaging Personally Reviewed: < from: MR Temporomandibular Joints (TMJ) (06.30.25 @ 11:48) >  IMPRESSION:    1.  RIGHT TMJ:   Articular disc remains appropriate in position. With   mouth opening there is asymmetric limited anterior translation.   Inflammatory changes in the overlying soft tissues are not well evaluated   by this technique. Right mastoid effusion. No significant   temporomandibular joint effusion is recognized. Follow-up evaluation with   gadolinium-enhanced images may be considered, as clinically warranted    2.  LEFT TMJ:   Intact without significant arthropathy.  Articular disc   remains appropriate in position and with mouth opening demonstrates   physiologic translation.    3. Asymmetrically numerous predominantly small right neck lymph nodes,   evaluation limited by anatomic coverage of this study    < end of copied text >    < from: CT Maxillofacial w/ IV Cont (06.29.25 @ 09:50) >  IMPRESSION:    1. Redemonstration of right-sided otitis externa and right-sided   otomastoiditis. No associated bony or ossicular chain destruction.    2. Localized inflammatory changes tracking into the right TMJ space   without bony destruction. Right-sided TMJ septic arthritis cannot be   excluded.    3. Reactive right-sided parotiditis.    4. Reactively enlarged bilateral cervical lymph nodes.    < end of copied text >      Consultant(s) Notes Reviewed:      Care Discussed with Consultants/Other Providers:

## 2025-06-30 NOTE — PROGRESS NOTE ADULT - SUBJECTIVE AND OBJECTIVE BOX
ENT Progress Note    Interval:  Patient's symptoms improving, minimal drainage from right ear. Wick still in place and patient on zosyn. MRI completed but without contrast. Tolerating diet at this time.    MEDICATIONS  (STANDING):  chlorhexidine 2% Cloths 1 Application(s) Topical daily  ciprofloxacin  0.3% Ophthalmic Solution for OTIC Use 5 Drop(s) Right Ear two times a day  dexAMETHasone 0.1% Ophthalmic Solution for OTIC Use 5 Drop(s) Right Ear two times a day  piperacillin/tazobactam IVPB.. 3.375 Gram(s) IV Intermittent every 8 hours    MEDICATIONS  (PRN):  acetaminophen     Tablet .. 650 milliGRAM(s) Oral every 6 hours PRN Temp greater or equal to 38C (100.4F), Mild Pain (1 - 3)  aluminum hydroxide/magnesium hydroxide/simethicone Suspension 30 milliLiter(s) Oral every 4 hours PRN Dyspepsia  ketorolac   Injectable 15 milliGRAM(s) IV Push every 6 hours PRN moderate and severe pain  melatonin 3 milliGRAM(s) Oral at bedtime PRN Insomnia  ondansetron Injectable 4 milliGRAM(s) IV Push every 8 hours PRN Nausea and/or Vomiting        Vital Signs Last 24 Hrs  T(C): 36.7 (30 Jun 2025 15:41), Max: 37.6 (30 Jun 2025 05:22)  T(F): 98.1 (30 Jun 2025 15:41), Max: 99.7 (30 Jun 2025 05:22)  HR: 72 (30 Jun 2025 15:41) (64 - 85)  BP: 110/65 (30 Jun 2025 15:41) (110/65 - 138/70)  BP(mean): --  RR: 18 (30 Jun 2025 15:41) (16 - 18)  SpO2: 100% (30 Jun 2025 15:41) (97% - 100%)    Parameters below as of 30 Jun 2025 15:41  Patient On (Oxygen Delivery Method): room air        Physical Exam:  Gen: NAD  Skin: No rashes, bruises, or lesions  Head: Normocephalic, Atraumatic  Face: no edema, erythema, or fluctuance. Mild swelling along the right TMJ  Eyes: no scleral injection  Ears: Right - ear canal inflamed with debris within the canal, wick in place, unable to visualize TM  Nose: Nares bilaterally patent, no discharge  Mouth: No stridor, no drooling, no trismus.  Mucosa moist, tongue/uvula midline, oropharynx clear, right TMJ tender to palpation  Neck: Flat, supple, no lymphadenopathy, trachea midline, no masses  Lymphatic: No lymphadenopathy  Neuro: facial nerve intact, no facial droop                                12.7   11.78 )-----------( 373      ( 30 Jun 2025 06:40 )             40.0    06-30    139  |  105  |  7   ----------------------------<  92  4.1   |  21[L]  |  0.76    Ca    9.4      30 Jun 2025 06:40  Phos  3.3     06-30  Mg     2.00     06-30    TPro  6.3  /  Alb  3.4  /  TBili  0.6  /  DBili  x   /  AST  16  /  ALT  10  /  AlkPhos  50  06-29

## 2025-06-30 NOTE — SBIRT NOTE ADULT - NSSBIRTALCPOSREINDET_GEN_A_CORE
Screening results were reviewed with the patient. Patient was provided educational materials on healthy guidelines and substance use and health. Motivation and goals were discussed.   Pt reports deceasing her usage on her own.

## 2025-07-01 LAB
ANION GAP SERPL CALC-SCNC: 17 MMOL/L — HIGH (ref 7–14)
BUN SERPL-MCNC: 10 MG/DL — SIGNIFICANT CHANGE UP (ref 7–23)
CALCIUM SERPL-MCNC: 8.9 MG/DL — SIGNIFICANT CHANGE UP (ref 8.4–10.5)
CHLORIDE SERPL-SCNC: 105 MMOL/L — SIGNIFICANT CHANGE UP (ref 98–107)
CO2 SERPL-SCNC: 19 MMOL/L — LOW (ref 22–31)
CREAT SERPL-MCNC: 0.66 MG/DL — SIGNIFICANT CHANGE UP (ref 0.5–1.3)
CULTURE RESULTS: ABNORMAL
EGFR: 129 ML/MIN/1.73M2 — SIGNIFICANT CHANGE UP
EGFR: 129 ML/MIN/1.73M2 — SIGNIFICANT CHANGE UP
GLUCOSE SERPL-MCNC: 88 MG/DL — SIGNIFICANT CHANGE UP (ref 70–99)
HCT VFR BLD CALC: 38.8 % — SIGNIFICANT CHANGE UP (ref 34.5–45)
HGB BLD-MCNC: 12.3 G/DL — SIGNIFICANT CHANGE UP (ref 11.5–15.5)
MAGNESIUM SERPL-MCNC: 2.1 MG/DL — SIGNIFICANT CHANGE UP (ref 1.6–2.6)
MCHC RBC-ENTMCNC: 28.6 PG — SIGNIFICANT CHANGE UP (ref 27–34)
MCHC RBC-ENTMCNC: 31.7 G/DL — LOW (ref 32–36)
MCV RBC AUTO: 90.2 FL — SIGNIFICANT CHANGE UP (ref 80–100)
N GONORRHOEA RRNA SPEC QL NAA+PROBE: SIGNIFICANT CHANGE UP
NRBC # BLD AUTO: 0 K/UL — SIGNIFICANT CHANGE UP (ref 0–0)
NRBC # FLD: 0 K/UL — SIGNIFICANT CHANGE UP (ref 0–0)
NRBC BLD AUTO-RTO: 0 /100 WBCS — SIGNIFICANT CHANGE UP (ref 0–0)
ORGANISM # SPEC MICROSCOPIC CNT: ABNORMAL
ORGANISM # SPEC MICROSCOPIC CNT: ABNORMAL
PHOSPHATE SERPL-MCNC: 4 MG/DL — SIGNIFICANT CHANGE UP (ref 2.5–4.5)
PLATELET # BLD AUTO: 339 K/UL — SIGNIFICANT CHANGE UP (ref 150–400)
PMV BLD: 10.9 FL — SIGNIFICANT CHANGE UP (ref 7–13)
POTASSIUM SERPL-MCNC: 3.7 MMOL/L — SIGNIFICANT CHANGE UP (ref 3.5–5.3)
POTASSIUM SERPL-SCNC: 3.7 MMOL/L — SIGNIFICANT CHANGE UP (ref 3.5–5.3)
RBC # BLD: 4.3 M/UL — SIGNIFICANT CHANGE UP (ref 3.8–5.2)
RBC # FLD: 13.9 % — SIGNIFICANT CHANGE UP (ref 10.3–14.5)
SODIUM SERPL-SCNC: 141 MMOL/L — SIGNIFICANT CHANGE UP (ref 135–145)
SPECIMEN SOURCE: SIGNIFICANT CHANGE UP
SPECIMEN SOURCE: SIGNIFICANT CHANGE UP
WBC # BLD: 9.86 K/UL — SIGNIFICANT CHANGE UP (ref 3.8–10.5)
WBC # FLD AUTO: 9.86 K/UL — SIGNIFICANT CHANGE UP (ref 3.8–10.5)

## 2025-07-01 PROCEDURE — G0545: CPT

## 2025-07-01 PROCEDURE — 99222 1ST HOSP IP/OBS MODERATE 55: CPT

## 2025-07-01 PROCEDURE — 99233 SBSQ HOSP IP/OBS HIGH 50: CPT

## 2025-07-01 RX ORDER — KETOROLAC TROMETHAMINE 30 MG/ML
15 INJECTION, SOLUTION INTRAMUSCULAR; INTRAVENOUS EVERY 6 HOURS
Refills: 0 | Status: DISCONTINUED | OUTPATIENT
Start: 2025-07-01 | End: 2025-07-02

## 2025-07-01 RX ADMIN — Medication 25 GRAM(S): at 01:15

## 2025-07-01 RX ADMIN — KETOROLAC TROMETHAMINE 15 MILLIGRAM(S): 30 INJECTION, SOLUTION INTRAMUSCULAR; INTRAVENOUS at 09:40

## 2025-07-01 RX ADMIN — KETOROLAC TROMETHAMINE 15 MILLIGRAM(S): 30 INJECTION, SOLUTION INTRAMUSCULAR; INTRAVENOUS at 03:14

## 2025-07-01 RX ADMIN — KETOROLAC TROMETHAMINE 15 MILLIGRAM(S): 30 INJECTION, SOLUTION INTRAMUSCULAR; INTRAVENOUS at 10:32

## 2025-07-01 RX ADMIN — Medication 5 DROP(S): at 17:24

## 2025-07-01 RX ADMIN — Medication 25 GRAM(S): at 09:39

## 2025-07-01 RX ADMIN — KETOROLAC TROMETHAMINE 15 MILLIGRAM(S): 30 INJECTION, SOLUTION INTRAMUSCULAR; INTRAVENOUS at 03:29

## 2025-07-01 RX ADMIN — Medication 5 DROP(S): at 05:18

## 2025-07-01 RX ADMIN — Medication 1 APPLICATION(S): at 11:19

## 2025-07-01 RX ADMIN — Medication 25 GRAM(S): at 17:24

## 2025-07-01 NOTE — PROGRESS NOTE ADULT - SUBJECTIVE AND OBJECTIVE BOX
Patient is a 19y old  Female who presents with a chief complaint of Right ear and jaw pain    HPI: 19F w/ no pmhx presents to the ED with right ear pain. Patient was diagnosed with otitis media and otitis externa and was started on antibiotic and eardrops. Patient experienced low grade fever and chills and worsening swelling and pain around the ear. Patient visited [facility]. She had elevated WBC to 15.7 and had a CT scan taken which showed right otitis externa, and subluxation of right TMJ with asymmetric hyperattenuation of right right TMJ. Patient was given zosyn and pain meds, which she states she now feels better. She was transferred to [place] ED for ENT eval.   Of note, patient went to the beach on Monday, had recently used Q-tips. And right ear pain began on Wednesday with Jaw pain beginning on Thursday.       Interval: Patient endorses doing significantly better, with improvements to pain and swelling     Vitals:     Vital Signs Last 24 Hrs  T(C): 36.9 (30 Jun 2025 21:20), Max: 37.6 (30 Jun 2025 05:22)  T(F): 98.4 (30 Jun 2025 21:20), Max: 99.7 (30 Jun 2025 05:22)  HR: 64 (30 Jun 2025 21:20) (64 - 85)  BP: 118/79 (30 Jun 2025 21:20) (110/65 - 121/76)  BP(mean): --  RR: 18 (30 Jun 2025 21:20) (16 - 18)  SpO2: 100% (30 Jun 2025 21:20) (97% - 100%)    Parameters below as of 30 Jun 2025 21:20  Patient On (Oxygen Delivery Method): room air        I&O's Detail      Medications:    MEDICATIONS  (STANDING):  chlorhexidine 2% Cloths 1 Application(s) Topical daily  ciprofloxacin  0.3% Ophthalmic Solution for OTIC Use 5 Drop(s) Right Ear two times a day  dexAMETHasone 0.1% Ophthalmic Solution for OTIC Use 5 Drop(s) Right Ear two times a day  piperacillin/tazobactam IVPB.. 3.375 Gram(s) IV Intermittent every 8 hours    MEDICATIONS  (PRN):  acetaminophen     Tablet .. 650 milliGRAM(s) Oral every 6 hours PRN Temp greater or equal to 38C (100.4F), Mild Pain (1 - 3)  aluminum hydroxide/magnesium hydroxide/simethicone Suspension 30 milliLiter(s) Oral every 4 hours PRN Dyspepsia  ketorolac   Injectable 15 milliGRAM(s) IV Push every 6 hours PRN moderate and severe pain  melatonin 3 milliGRAM(s) Oral at bedtime PRN Insomnia  ondansetron Injectable 4 milliGRAM(s) IV Push every 8 hours PRN Nausea and/or Vomiting      Labs:                          12.7   11.78 )-----------( 373      ( 30 Jun 2025 06:40 )             40.0       06-30    139  |  105  |  7   ----------------------------<  92  4.1   |  21[L]  |  0.76    Ca    9.4      30 Jun 2025 06:40  Phos  3.3     06-30  Mg     2.00     06-30    TPro  6.3  /  Alb  3.4  /  TBili  0.6  /  DBili  x   /  AST  16  /  ALT  10  /  AlkPhos  50  06-29      PHYSICAL EXAM:  GENERAL: NAD, =  HEENT - NC/AT, Intraoral exam is unremarkable .significant improvement to swellingwith Wick's placement inside ear. Right ear with orange drainage over the earlobe. Soft tissue over the right TMJ soft and minimally tender  Intraoral exam without significant findings. PONCE >35mm. No pain upon opening or closing.   NECK: Supple, No JVD  CHEST/LUNG: Clear to auscultation bilaterally; No rales, rhonchi, wheezing  HEART: Regular rate and rhythm; No murmurs, rubs, or gallops  ABDOMEN: Soft, Nontender, Nondistended; Bowel sounds present  EXTREMITIES:  2+ Peripheral Pulses, No clubbing, cyanosis, or edema  NEURO:  No Focal deficits, sensory and motor intact  SKIN: No rashes or lesions

## 2025-07-01 NOTE — CONSULT NOTE ADULT - SUBJECTIVE AND OBJECTIVE BOX
HPI:  19 f with prior otitis externa, went swimming in the beach on 6/23 and on 6/25 noted R ear discomfort then worsening swelling, drainage and difficulty opening the mouth. On 6/27 went to urgent care given neomycin/polymyxin/hydrocort and PO cipro which she took about 2 days but developed nausea, vomiting   here afebrile tmax in 99s  WBC initially 15  auditory CT showed R otitis externa.  No discrete abscess or drainable fluid collection, asymmetric subluxation of the right temporomandibular joint with asymmetric hyperattenuation in the right temporomandibular joint, which raises the possibility of septic arthritis. No gross CT evidence for intracranial spread of infection.  maxillofacial CT showed R sided otitis externa and otomastoiditis. No associated bony or ossicular chain destruction.  Localized inflammatory changes tracking into the right TMJ space   without bony destruction. Right-sided TMJ septic arthritis cannot be excluded. . Reactive right-sided parotiditis.  Reactively enlarged bilateral cervical lymph nodes.  TMJ MRI showed Articular disc remains appropriate in position. With mouth opening there is asymmetric limited anterior translation. Inflammatory changes in the overlying soft tissues are not well evaluated by this technique. Right mastoid effusion. No significant temporomandibular joint effusion is recognized. Follow-up evaluation with gadolinium-enhanced images may be considered, as clinically warranted  s/p dbridement and wick by ENT but cx negative  blood cx: 1/4 staph epi likely contaminant  now improving on zosyn      PAST MEDICAL & SURGICAL HISTORY:  No pertinent past medical history      No significant past surgical history          Allergies    No Known Allergies    Intolerances        ANTIMICROBIALS:  piperacillin/tazobactam IVPB.. 3.375 every 8 hours      OTHER MEDS:  acetaminophen     Tablet .. 650 milliGRAM(s) Oral every 6 hours PRN  aluminum hydroxide/magnesium hydroxide/simethicone Suspension 30 milliLiter(s) Oral every 4 hours PRN  chlorhexidine 2% Cloths 1 Application(s) Topical daily  ciprofloxacin  0.3% Ophthalmic Solution for OTIC Use 5 Drop(s) Right Ear two times a day  dexAMETHasone 0.1% Ophthalmic Solution for OTIC Use 5 Drop(s) Right Ear two times a day  ketorolac   Injectable 15 milliGRAM(s) IV Push every 6 hours PRN  melatonin 3 milliGRAM(s) Oral at bedtime PRN  ondansetron Injectable 4 milliGRAM(s) IV Push every 8 hours PRN      SOCIAL HISTORY:  lives with mother and sister  active smoker  no recent travel    FAMILY HISTORY:  No recent febrile illness in family members        ROS:    All other systems negative     Constitutional: no fever, no chills, no weight loss, no night sweats  Eye: no eye pain, no redness, no vision changes  ENT:  R ear edema, tenderness, no mastoid tenderness  Cardiovascular:  no chest pain, no palpitation  Respiratory:  no SOB, no cough  GI:  no abd pain, + nausea vomiting  urinary: no dysuria, no hematuria, no flank pain  : no vaginal discharge or bleeding  musculoskeletal:  no joint pain, no joint swelling  skin:  no rash  neurology:  no headache, no seizure, no change in mental status  psych: no anxiety, no depression     Physical Exam:    General:    NAD, non toxic  Head: atraumatic, normocephalic  Eyes: normal sclera and conjunctiva  ENT:   no oropharyngeal lesions, no LAD, neck supple  Cardio:    regular S1,S2, no murmur  Respiratory:   clear b/l, no wheezing  abd:   soft, BS +, not tender  :     no CVAT, no suprapubic tenderness, no palmer  Musculoskeletal : no joint swelling, no edema  Skin:    no rash  vascular: no phlebitis  Neurologic:     no focal deficits  psych: normal affect      Drug Dosing Weight  Height (cm): 167.6 (29 Jun 2025 01:20)  Weight (kg): 84.4 (29 Jun 2025 01:20)  BMI (kg/m2): 30 (29 Jun 2025 01:20)  BSA (m2): 1.94 (29 Jun 2025 01:20)    Vital Signs Last 24 Hrs  T(F): 98.2 (07-01-25 @ 13:25), Max: 100 (06-28-25 @ 23:33)    Vital Signs Last 24 Hrs  HR: 65 (07-01-25 @ 13:25) (64 - 72)  BP: 100/62 (07-01-25 @ 13:25) (100/62 - 118/79)  RR: 16 (07-01-25 @ 13:25)  SpO2: 100% (07-01-25 @ 13:25) (100% - 100%)  Wt(kg): --                          12.3   9.86  )-----------( 339      ( 01 Jul 2025 05:15 )             38.8       07-01    141  |  105  |  10  ----------------------------<  88  3.7   |  19[L]  |  0.66    Ca    8.9      01 Jul 2025 05:15  Phos  4.0     07-01  Mg     2.10     07-01        Urinalysis Basic - ( 01 Jul 2025 05:15 )    Color: x / Appearance: x / SG: x / pH: x  Gluc: 88 mg/dL / Ketone: x  / Bili: x / Urobili: x   Blood: x / Protein: x / Nitrite: x   Leuk Esterase: x / RBC: x / WBC x   Sq Epi: x / Non Sq Epi: x / Bacteria: x        MICROBIOLOGY:  v  Ear Ear  06-29-25   No growth  --    polymorphonuclear leukocytes seen  No organisms seen  by cytocentrifuge      Blood Blood-Peripheral  06-29-25   No growth at 24 hours  --  --      Blood Blood  06-29-25   Growth in anaerobic bottle: Gram Positive Cocci in Clusters  Direct identification is available within approximately 3-5  hours either by Blood Panel Multiplexed PCR or Direct  MALDI-TOF. Details: [email or web address]  --  Blood Culture PCR                  RADIOLOGY:    Images independently visualized and reviewed personally,  findings as below    < from: MR Temporomandibular Joints (TMJ) (06.30.25 @ 11:48) >  IMPRESSION:    1.  RIGHT TMJ:   Articular disc remains appropriate in position. With   mouth opening there is asymmetric limited anterior translation.   Inflammatory changes in the overlying soft tissues are not well evaluated   by this technique. Right mastoid effusion. No significant   temporomandibular joint effusion is recognized. Follow-up evaluation with   gadolinium-enhanced images may be considered, as clinically warranted    2.  LEFT TMJ:   Intact without significant arthropathy.  Articular disc   remains appropriate in position and with mouth opening demonstrates   physiologic translation.    3. Asymmetrically numerous predominantly small right neck lymph nodes,   evaluation limited by anatomic coverage of this study    < end of copied text >  < from: CT Maxillofacial w/ IV Cont (06.29.25 @ 09:50) >  IMPRESSION:    1. Redemonstration of right-sided otitis externa and right-sided   otomastoiditis. No associated bony or ossicular chain destruction.    2. Localized inflammatory changes tracking into the right TMJ space   without bony destruction. Right-sided TMJ septic arthritis cannot be   excluded.    3. Reactive right-sided parotiditis.    4. Reactively enlarged bilateral cervical lymph nodes.        < end of copied text >  < from: CT Internal Auditory Canals w/ IV Cont (06.28.25 @ 21:58) >  CT findings consistent with right otitis externa.  No discrete abscess or   drainable fluid collection is identified by CT technique.    There isasymmetric subluxation of the right temporomandibular joint with   asymmetric hyperattenuation in the right temporomandibular joint, which   raises the possibility of septic arthritis.    No gross CT evidence for intracranial spread of infection.    Follow-up contrast enhanced MRI may be obtained for more sensitive   evaluation.      < end of copied text >  
CC:  right ear pain  HPI:   19-year-old female with no significant past medical history presents to the ED with right ear pain for few days.  Patient was diagnosed with otitis media and otitis externa and started on antibiotics and eardrops as well.  However patient noted low-grade fevers, chills and worsening pain and swelling around the ear and went to [facility]. While at [facility] patient was imaged with CT scan due to tenderness and pain along the right jaw that was extending from the right ear. CT showed inflammation along the right TMJ as well as inflammation within the right ear canal. She denies having similar symptoms before but does endorse the use of q-tips recently. She also denies a history of significant discharge from the ear or diabetes.    PAST MEDICAL & SURGICAL HISTORY:    Allergies    No Known Allergies    Intolerances      MEDICATIONS  (STANDING):    MEDICATIONS  (PRN):      Social History: **??**    Family history: No pertinent family history in first degree relatives    ROS:   ENT: all negative except as noted in HPI   CV: denies palpitations  Pulm: denies SOB, cough, hemoptysis  GI: denies change in appetite, indigestion, n/v  : denies pertinent urinary symptoms, urgency  Neuro: denies numbness/tingling, loss of sensation  Psych: denies anxiety  MS: denies muscle weakness, instability  Heme: denies easy bruising or bleeding  Endo: denies heat/cold intolerance, excessive sweating  Vascular: denies LE edema    Vital Signs Last 24 Hrs  T(C): 37.2 (29 Jun 2025 08:59), Max: 37.8 (28 Jun 2025 23:33)  T(F): 99 (29 Jun 2025 08:59), Max: 100 (28 Jun 2025 23:33)  HR: 66 (29 Jun 2025 08:59) (52 - 72)  BP: 123/73 (29 Jun 2025 08:59) (121/75 - 131/83)  BP(mean): 99 (29 Jun 2025 06:34) (99 - 99)  RR: 18 (29 Jun 2025 08:59) (16 - 18)  SpO2: 100% (29 Jun 2025 08:59) (98% - 100%)    Parameters below as of 29 Jun 2025 08:59  Patient On (Oxygen Delivery Method): room air                              12.2   15.70 )-----------( 271      ( 28 Jun 2025 20:46 )             37.3    06-28    137  |  106  |  6[L]  ----------------------------<  94  3.8   |  24  |  0.75    Ca    8.7      28 Jun 2025 20:46    TPro  7.1  /  Alb  3.4[L]  /  TBili  0.8  /  DBili  x   /  AST  20  /  ALT  17  /  AlkPhos  53  06-28       PHYSICAL EXAM:  Gen: NAD  Skin: No rashes, bruises, or lesions  Head: Normocephalic, Atraumatic  Face: no edema, erythema, or fluctuance. Mild swelling along the right TMJ  Eyes: no scleral injection  Ears: Right - ear canal inflamed with debris within the canal, unable to visualize TM  Nose: Nares bilaterally patent, no discharge  Mouth: No stridor, no drooling, no trismus.  Mucosa moist, tongue/uvula midline, oropharynx clear, right TMJ tender to palpation  Neck: Flat, supple, no lymphadenopathy, trachea midline, no masses  Lymphatic: No lymphadenopathy  Neuro: facial nerve intact, no facial droop      IMAGING/ADDITIONAL STUDIES: 
Oral and Maxillofacial SURGERY CONSULT NOTE    SADAF WILSON  |  [id number]   |   19yFemale   |   [facility] ED      Patient is a 19y old  Female who presents with a chief complaint of Right ear and jaw pain    HPI: 19F w/ no pmhx presents to the ED with right ear pain. Patient was diagnosed with otitis media and otitis externa and was started on antibiotic and eardrops. Patient experienced low grade fever and chills and worsening swelling and pain around the ear. Patient visited [facility]. She had elevated WBC to 15.7 and had a CT scan taken which showed right otitis externa, and subluxation of right TMJ with asymmetric hyperattenuation of right right TMJ. Patient was given zosyn and pain meds, which she states she now feels better. She was transferred to [facility] ED for ENT eval.   Of note, patient went to the beach on Monday, had recently used Q-tips. And right ear pain began on Wednesday with Jaw pain beginning on Thursday.       PAST MEDICAL & SURGICAL HISTORY:        FAMILY HISTORY:      SOCIAL HISTORY:  Vaccination Status:     MEDICATIONS  (STANDING):    MEDICATIONS  (PRN):    Allergies    No Known Allergies    Intolerances        Vital Signs Last 24 Hrs  T(C): 37.2 (29 Jun 2025 08:59), Max: 37.8 (28 Jun 2025 23:33)  T(F): 99 (29 Jun 2025 08:59), Max: 100 (28 Jun 2025 23:33)  HR: 66 (29 Jun 2025 08:59) (52 - 72)  BP: 123/73 (29 Jun 2025 08:59) (121/75 - 131/83)  BP(mean): 99 (29 Jun 2025 06:34) (99 - 99)  RR: 18 (29 Jun 2025 08:59) (16 - 18)  SpO2: 100% (29 Jun 2025 08:59) (98% - 100%)    Parameters below as of 29 Jun 2025 08:59  Patient On (Oxygen Delivery Method): room air        PHYSICAL EXAM:  GENERAL: NAD, resting on stretcher.   HEENT - NC/AT, Intraoral exam is unremarkable. Edema and pain appreciated over the anterior and posterior aspect of the ear, with Wick's placement inside ear. Right ear with orange drainage over the earlobe. Soft tissue over the right TMJ  Intraoral exam without significant findings. PONCE >35mm. No pain upon opening or closing.   NECK: Supple, No JVD  CHEST/LUNG: Clear to auscultation bilaterally; No rales, rhonchi, wheezing  HEART: Regular rate and rhythm; No murmurs, rubs, or gallops  ABDOMEN: Soft, Nontender, Nondistended; Bowel sounds present  EXTREMITIES:  2+ Peripheral Pulses, No clubbing, cyanosis, or edema  NEURO:  No Focal deficits, sensory and motor intact  SKIN: No rashes or lesions                          12.2   15.70 )-----------( 271      ( 28 Jun 2025 20:46 )             37.3     06-28    137  |  106  |  6[L]  ----------------------------<  94  3.8   |  24  |  0.75    Ca    8.7      28 Jun 2025 20:46    TPro  7.1  /  Alb  3.4[L]  /  TBili  0.8  /  DBili  x   /  AST  20  /  ALT  17  /  AlkPhos  53  06-28      Urinalysis Basic - ( 28 Jun 2025 20:46 )    Color: x / Appearance: x / SG: x / pH: x  Gluc: 94 mg/dL / Ketone: x  / Bili: x / Urobili: x   Blood: x / Protein: x / Nitrite: x   Leuk Esterase: x / RBC: x / WBC x   Sq Epi: x / Non Sq Epi: x / Bacteria: x        IMAGING STUDIES:    FINDINGS: Abnormal soft tissue thickening and enhancement is again seen involving the right external auditory canal. This also tracks into the bony external auditory canal. Nonspecific soft tissue and fluid is seen within the right tympanomastoid cavity. No gross ossicular destruction is appreciated.    Localized inflammatory changes also track into the right TMJ space with there is slight anterior subluxation of the condylar head out of the TMJ fossa. There is no bony destruction.    Adjacent localized inflammatory changes also affect the right parotid gland.    Enlarged bilateral cervical lymph nodes are seen which are reactive.    The maxilla, mandible, and zygomatic arches are intact. The left TMJ space appears unremarkable.    There is no nasal cavity mass. The nasopharynx and imaged portions of the aerodigestive tract appear grossly unremarkable.    The paranasal sinuses and left tympanomastoid cavity are clear.    The orbital contents appear unremarkable.    No acute intracranial findings are seen.    IMPRESSION:    1. Redemonstration of right-sided otitis externa and right-sided otomastoiditis. No associated bony or ossicular chain destruction.    2. Localized inflammatory changes tracking into the right TMJ space without bony destruction. Right-sided TMJ septic arthritis cannot be excluded.    3. Reactive right-sided parotiditis.    4. Reactively enlarged bilateral cervical lymph nodes.

## 2025-07-01 NOTE — PROGRESS NOTE ADULT - SUBJECTIVE AND OBJECTIVE BOX
Patient is a 19y old  Female who presents with a chief complaint of R ear pain (01 Jul 2025 01:26)      SUBJECTIVE / OVERNIGHT EVENTS:    MEDICATIONS  (STANDING):  chlorhexidine 2% Cloths 1 Application(s) Topical daily  ciprofloxacin  0.3% Ophthalmic Solution for OTIC Use 5 Drop(s) Right Ear two times a day  dexAMETHasone 0.1% Ophthalmic Solution for OTIC Use 5 Drop(s) Right Ear two times a day  piperacillin/tazobactam IVPB.. 3.375 Gram(s) IV Intermittent every 8 hours    MEDICATIONS  (PRN):  acetaminophen     Tablet .. 650 milliGRAM(s) Oral every 6 hours PRN Temp greater or equal to 38C (100.4F), Mild Pain (1 - 3)  aluminum hydroxide/magnesium hydroxide/simethicone Suspension 30 milliLiter(s) Oral every 4 hours PRN Dyspepsia  ketorolac   Injectable 15 milliGRAM(s) IV Push every 6 hours PRN moderate and severe pain  melatonin 3 milliGRAM(s) Oral at bedtime PRN Insomnia  ondansetron Injectable 4 milliGRAM(s) IV Push every 8 hours PRN Nausea and/or Vomiting      Vital Signs Last 24 Hrs  T(C): 36.9 (01 Jul 2025 05:16), Max: 36.9 (30 Jun 2025 21:20)  T(F): 98.4 (01 Jul 2025 05:16), Max: 98.4 (30 Jun 2025 21:20)  HR: 71 (01 Jul 2025 05:16) (64 - 72)  BP: 107/63 (01 Jul 2025 05:16) (107/63 - 121/76)  BP(mean): --  RR: 18 (01 Jul 2025 05:16) (18 - 18)  SpO2: 100% (01 Jul 2025 05:16) (100% - 100%)    Parameters below as of 01 Jul 2025 05:16  Patient On (Oxygen Delivery Method): room air      CAPILLARY BLOOD GLUCOSE        I&O's Summary      PHYSICAL EXAM:  GENERAL: NAD, well-developed  HEAD:  Atraumatic, Normocephalic  EYES: EOMI, PERRLA, conjunctiva and sclera clear  NECK: Supple, No JVD  CHEST/LUNG: Clear to auscultation bilaterally; No wheeze  HEART: Regular rate and rhythm; No murmurs, rubs, or gallops  ABDOMEN: Soft, Nontender, Nondistended; Bowel sounds present  EXTREMITIES:  2+ Peripheral Pulses, No clubbing, cyanosis, or edema  PSYCH: AAOx3  NEUROLOGY: non-focal  SKIN: No rashes or lesions    LABS:                        12.3   9.86  )-----------( 339      ( 01 Jul 2025 05:15 )             38.8     07-01    141  |  105  |  10  ----------------------------<  88  3.7   |  19[L]  |  0.66    Ca    8.9      01 Jul 2025 05:15  Phos  4.0     07-01  Mg     2.10     07-01    TPro  6.3  /  Alb  3.4  /  TBili  0.6  /  DBili  x   /  AST  16  /  ALT  10  /  AlkPhos  50  06-29          Urinalysis Basic - ( 01 Jul 2025 05:15 )    Color: x / Appearance: x / SG: x / pH: x  Gluc: 88 mg/dL / Ketone: x  / Bili: x / Urobili: x   Blood: x / Protein: x / Nitrite: x   Leuk Esterase: x / RBC: x / WBC x   Sq Epi: x / Non Sq Epi: x / Bacteria: x        RADIOLOGY & ADDITIONAL TESTS:    Imaging Personally Reviewed:    Consultant(s) Notes Reviewed:      Care Discussed with Consultants/Other Providers:   Patient is a 19y old  Female who presents with a chief complaint of R ear pain (01 Jul 2025 01:26)      SUBJECTIVE / OVERNIGHT EVENTS:  Patient says pain is much improved. Patient has no new complaints. Denies cp, SOB, abdominal pain, N/V/D     MEDICATIONS  (STANDING):  chlorhexidine 2% Cloths 1 Application(s) Topical daily  ciprofloxacin  0.3% Ophthalmic Solution for OTIC Use 5 Drop(s) Right Ear two times a day  dexAMETHasone 0.1% Ophthalmic Solution for OTIC Use 5 Drop(s) Right Ear two times a day  piperacillin/tazobactam IVPB.. 3.375 Gram(s) IV Intermittent every 8 hours    MEDICATIONS  (PRN):  acetaminophen     Tablet .. 650 milliGRAM(s) Oral every 6 hours PRN Temp greater or equal to 38C (100.4F), Mild Pain (1 - 3)  aluminum hydroxide/magnesium hydroxide/simethicone Suspension 30 milliLiter(s) Oral every 4 hours PRN Dyspepsia  ketorolac   Injectable 15 milliGRAM(s) IV Push every 6 hours PRN moderate and severe pain  melatonin 3 milliGRAM(s) Oral at bedtime PRN Insomnia  ondansetron Injectable 4 milliGRAM(s) IV Push every 8 hours PRN Nausea and/or Vomiting      Vital Signs Last 24 Hrs  T(C): 36.9 (01 Jul 2025 05:16), Max: 36.9 (30 Jun 2025 21:20)  T(F): 98.4 (01 Jul 2025 05:16), Max: 98.4 (30 Jun 2025 21:20)  HR: 71 (01 Jul 2025 05:16) (64 - 72)  BP: 107/63 (01 Jul 2025 05:16) (107/63 - 121/76)  BP(mean): --  RR: 18 (01 Jul 2025 05:16) (18 - 18)  SpO2: 100% (01 Jul 2025 05:16) (100% - 100%)    Parameters below as of 01 Jul 2025 05:16  Patient On (Oxygen Delivery Method): room air      CAPILLARY BLOOD GLUCOSE        I&O's Summary    PHYSICAL EXAM:   GENERAL: NAD, well-developed  HEAD:  Atraumatic, Normocephalic, R ear drainage, TTP of mastoid and TMJ + wick  EYES: EOMI, PERRLA, conjunctiva and sclera clear   NECK: Supple, No JVD  CHEST/LUNG: Clear to auscultation bilaterally; No wheeze  HEART: Regular rate and rhythm; No murmurs, rubs, or gallops  ABDOMEN: Soft, Nontender, Nondistended; Bowel sounds present  EXTREMITIES:  2+ Peripheral Pulses, No clubbing, cyanosis, or edema  PSYCH: AAOx3  NEUROLOGY: non-focal  SKIN: No rashes or lesions    LABS:                        12.3   9.86  )-----------( 339      ( 01 Jul 2025 05:15 )             38.8     07-01    141  |  105  |  10  ----------------------------<  88  3.7   |  19[L]  |  0.66    Ca    8.9      01 Jul 2025 05:15  Phos  4.0     07-01  Mg     2.10     07-01    TPro  6.3  /  Alb  3.4  /  TBili  0.6  /  DBili  x   /  AST  16  /  ALT  10  /  AlkPhos  50  06-29          Urinalysis Basic - ( 01 Jul 2025 05:15 )    Color: x / Appearance: x / SG: x / pH: x  Gluc: 88 mg/dL / Ketone: x  / Bili: x / Urobili: x   Blood: x / Protein: x / Nitrite: x   Leuk Esterase: x / RBC: x / WBC x   Sq Epi: x / Non Sq Epi: x / Bacteria: x        RADIOLOGY & ADDITIONAL TESTS:    Imaging Personally Reviewed:    Consultant(s) Notes Reviewed:      Care Discussed with Consultants/Other Providers:

## 2025-07-01 NOTE — DISCHARGE NOTE PROVIDER - NSDCCPCAREPLAN_GEN_ALL_CORE_FT
PRINCIPAL DISCHARGE DIAGNOSIS  Diagnosis: Otitis media  Assessment and Plan of Treatment: please take your ear drops and antibiotics as directed.      SECONDARY DISCHARGE DIAGNOSES  Diagnosis: Otitis externa  Assessment and Plan of Treatment: please follow up with ENT in 203 days to manage wick in ear.     PRINCIPAL DISCHARGE DIAGNOSIS  Diagnosis: Otitis media  Assessment and Plan of Treatment: please take your ear drops and antibiotics as directed.  Keep R. ear dry until infection resolution  -Call for follow up outpatient with ENT on Monday 7/7 for wick removal: [phone].      SECONDARY DISCHARGE DIAGNOSES  Diagnosis: TMJ inflammation  Assessment and Plan of Treatment: Follow up with ENT    Diagnosis: Otitis externa  Assessment and Plan of Treatment: Follow up with ENT     PRINCIPAL DISCHARGE DIAGNOSIS  Diagnosis: Otitis media  Assessment and Plan of Treatment: please take your ear drops and antibiotics as directed.  Keep R. ear dry until infection resolution  -Call for follow up outpatient with ENT on Monday 7/7 for wick removal: [phone].      SECONDARY DISCHARGE DIAGNOSES  Diagnosis: TMJ inflammation  Assessment and Plan of Treatment: Follow up with ENT  Follow up with, Oral and Maxillofacial Surgery, in 1 week at [facility] [phone]    Diagnosis: Otitis externa  Assessment and Plan of Treatment: Follow up with ENT

## 2025-07-01 NOTE — DISCHARGE NOTE PROVIDER - PROVIDER TOKENS
PROVIDER:[TOKEN:[[id number]]],PROVIDER:[TOKEN:[[id number]]] PROVIDER:[TOKEN:[[id number]]],PROVIDER:[TOKEN:[[id number]]],PROVIDER:[TOKEN:[[id number]]]

## 2025-07-01 NOTE — DISCHARGE NOTE PROVIDER - HOSPITAL COURSE
19 y.o. Female  smoker no medical history, prior otitis externa who presents w/ worsening R ear pain found to have Right ear Otitis media and otitis externa    Otitis media/externa  - c/w cipro and dexamethasone ear drops  -c/w Zosyn to cover pseudomonas  -ENT and OMFS were following  -right ear discharge Cx NTD.  -ID consulted and assessed ...............    TMJ inflammation  -r/o septic TMJ joint  - ESR 34, CRP 52  - blood cx NTD  - HIV negative  -GC chlamydia not collected  - on zosyn  - MRI TMJ w/o contrast was negative  -OMFS says no need for MRI with contrast.     Hyponatremia  - resolved.     19 y.o. Female  smoker no medical history, prior otitis externa who presents w/ worsening R ear pain found to have Right ear Otitis media and otitis externa    Otitis media/externa  - c/w cipro and dexamethasone ear drops  -c/w Zosyn to cover pseudomonas  -ENT and OMFS were following  -right ear discharge Cx NTD.  -switch to PO augmentin 875 bid and cipro 750 q 12 to complete a total 3 weeks as per ID  -c/w ciprodex drops 5 drops BID to R ear for 10 days as per ENT  - follow up outpatient with ENT on Monday 7/7 for wick removal: [phone].    TMJ inflammation  -r/o septic TMJ joint  - ESR 34, CRP 52  - blood cx NTD  - HIV negative  -GC chlamydia not collected  - on zosyn  - MRI TMJ w/o contrast was negative  -OMFS says no need for MRI with contrast.     Hyponatremia  - resolved.

## 2025-07-01 NOTE — PROGRESS NOTE ADULT - PROBLEM SELECTOR PLAN 4
ambulatory, no PT needs  dc pending imaging and w/u ambulatory, no PT needs  dc pending ID eval for duration of abx

## 2025-07-01 NOTE — PROGRESS NOTE ADULT - SUBJECTIVE AND OBJECTIVE BOX
ENT Progress Note    Interval:  Patient's symptoms improving, minimal drainage from right ear. Swelling improved, but unable to see TM so replaced wick. Overall feeling improved.    MEDICATIONS  (STANDING):  chlorhexidine 2% Cloths 1 Application(s) Topical daily  ciprofloxacin  0.3% Ophthalmic Solution for OTIC Use 5 Drop(s) Right Ear two times a day  dexAMETHasone 0.1% Ophthalmic Solution for OTIC Use 5 Drop(s) Right Ear two times a day  piperacillin/tazobactam IVPB.. 3.375 Gram(s) IV Intermittent every 8 hours    MEDICATIONS  (PRN):  acetaminophen     Tablet .. 650 milliGRAM(s) Oral every 6 hours PRN Temp greater or equal to 38C (100.4F), Mild Pain (1 - 3)  aluminum hydroxide/magnesium hydroxide/simethicone Suspension 30 milliLiter(s) Oral every 4 hours PRN Dyspepsia  ketorolac   Injectable 15 milliGRAM(s) IV Push every 6 hours PRN moderate and severe pain  melatonin 3 milliGRAM(s) Oral at bedtime PRN Insomnia  ondansetron Injectable 4 milliGRAM(s) IV Push every 8 hours PRN Nausea and/or Vomiting        Vital Signs Last 24 Hrs  T(C): 36.9 (01 Jul 2025 05:16), Max: 36.9 (30 Jun 2025 21:20)  T(F): 98.4 (01 Jul 2025 05:16), Max: 98.4 (30 Jun 2025 21:20)  HR: 71 (01 Jul 2025 05:16) (64 - 72)  BP: 107/63 (01 Jul 2025 05:16) (107/63 - 118/79)  BP(mean): --  RR: 18 (01 Jul 2025 05:16) (18 - 18)  SpO2: 100% (01 Jul 2025 05:16) (100% - 100%)    Parameters below as of 01 Jul 2025 05:16  Patient On (Oxygen Delivery Method): room air            Physical Exam:  Gen: NAD  Skin: No rashes, bruises, or lesions  Head: Normocephalic, Atraumatic  Face: no edema, erythema, or fluctuance. Mild swelling along the right TMJ  Eyes: no scleral injection  Ears: Right - ear canal inflamed with debris within the canal, unable to visualize TM, replaced wick  Nose: Nares bilaterally patent, no discharge  Mouth: No stridor, no drooling, no trismus.  Mucosa moist, tongue/uvula midline, oropharynx clear, right TMJ tender to palpation  Neck: Flat, supple, no lymphadenopathy, trachea midline, no masses  Lymphatic: No lymphadenopathy  Neuro: facial nerve intact, no facial droop          LABS:                        12.3   9.86  )-----------( 339      ( 01 Jul 2025 05:15 )             38.8     07-01    141  |  105  |  10  ----------------------------<  88  3.7   |  19[L]  |  0.66    Ca    8.9      01 Jul 2025 05:15  Phos  4.0     07-01  Mg     2.10     07-01    TPro  6.3  /  Alb  3.4  /  TBili  0.6  /  DBili  x   /  AST  16  /  ALT  10  /  AlkPhos  50  06-29

## 2025-07-01 NOTE — PROGRESS NOTE ADULT - PROBLEM SELECTOR PLAN 2
r/o septic TMJ joint  - ESR 34, CRP 52  - f/u blood cx   - HIV negative  -GC chlamydia not collected  - c/w zosyn  - MRI TMJ w/o contrast was negative  -OMFS says no need for MRI with contrast.   - soft diet

## 2025-07-01 NOTE — DISCHARGE NOTE PROVIDER - NSDCMRMEDTOKEN_GEN_ALL_CORE_FT
Cipro 500 mg oral tablet: 1 tab(s) orally 2 times a day  neomycin/polymyxin/hydrocortisone Otic suspension:    amoxicillin-clavulanate 875 mg-125 mg oral tablet: 1 tab(s) orally 2 times a day  ciprofloxacin 0.3% ophthalmic solution: 5 drop(s) in the right ear 2 times a day x 7 days  ciprofloxacin 750 mg oral tablet: 1 tab(s) orally 2 times a day  dexAMETHasone 0.1% ophthalmic solution: 5 drop(s) in the right ear 2 times a day X 7days  ibuprofen 400 mg oral tablet: 1 tab(s) orally every 6 hours as needed for pain

## 2025-07-01 NOTE — CONSULT NOTE ADULT - ASSESSMENT
19 f with prior otitis externa, went swimming in the beach on 6/23 and on 6/25 noted R ear discomfort then worsening swelling, drainage and difficulty opening the mouth. On 6/27 went to urgent care given neomycin/polymyxin/hydrocort and PO cipro which she took about 2 days but developed nausea, vomiting   here afebrile tmax in 99s  WBC initially 15  auditory CT showed R otitis externa.  No discrete abscess or drainable fluid collection, asymmetric subluxation of the right temporomandibular joint with asymmetric hyperattenuation in the right temporomandibular joint, which raises the possibility of septic arthritis. No gross CT evidence for intracranial spread of infection.  maxillofacial CT showed R sided otitis externa and otomastoiditis. No associated bony or ossicular chain destruction.  Localized inflammatory changes tracking into the right TMJ space   without bony destruction. Right-sided TMJ septic arthritis cannot be excluded. . Reactive right-sided parotiditis.  Reactively enlarged bilateral cervical lymph nodes.  TMJ MRI showed Articular disc remains appropriate in position. With mouth opening there is asymmetric limited anterior translation. Inflammatory changes in the overlying soft tissues are not well evaluated by this technique. Right mastoid effusion. No significant temporomandibular joint effusion is recognized. Follow-up evaluation with gadolinium-enhanced images may be considered, as clinically warranted  s/p dbridement and wick by ENT but cx negative  blood cx: 1/4 staph epi likely contaminant  now improving on zosyn    R otitis externa and otomastoiditis with no bony destruction and in intracranial extention s/p wick but cx negative  there was concern for subluxation of R TMJ and septic arthritis but the TMJ MRI did not show subluxation and no joint effusion just inflammatory changes tracking into R TMJ space    * follow the ear cx  * c/w zosyn for now  * follow with ENT    The above assessment and plan was discussed with the primary team    Linh George MD  contact on teams  After 5pm and on weekends call [phone] 19 f with prior otitis externa, went swimming in the beach on 6/23 and on 6/25 noted R ear discomfort then worsening swelling, drainage and difficulty opening the mouth. On 6/27 went to urgent care given neomycin/polymyxin/hydrocort and PO cipro which she took about 2 days but developed nausea, vomiting   here afebrile tmax in 99s  WBC initially 15  auditory CT showed R otitis externa.  No discrete abscess or drainable fluid collection, asymmetric subluxation of the right temporomandibular joint with asymmetric hyperattenuation in the right temporomandibular joint, which raises the possibility of septic arthritis. No gross CT evidence for intracranial spread of infection.  maxillofacial CT showed R sided otitis externa and otomastoiditis. No associated bony or ossicular chain destruction.  Localized inflammatory changes tracking into the right TMJ space   without bony destruction. Right-sided TMJ septic arthritis cannot be excluded. . Reactive right-sided parotiditis.  Reactively enlarged bilateral cervical lymph nodes.  TMJ MRI showed Articular disc remains appropriate in position. With mouth opening there is asymmetric limited anterior translation. Inflammatory changes in the overlying soft tissues are not well evaluated by this technique. Right mastoid effusion. No significant temporomandibular joint effusion is recognized. Follow-up evaluation with gadolinium-enhanced images may be considered, as clinically warranted  s/p dbridement and wick by ENT but cx negative  blood cx: 1/4 staph epi likely contaminant  now improving on zosyn    leukocytosis, R otitis externa and otomastoiditis with no bony destruction and in intracranial extention s/p wick but cx negative  there was concern for subluxation of R TMJ and septic arthritis but the TMJ MRI did not show subluxation and no joint effusion just inflammatory changes tracking into R TMJ space  1/4 staph epi in the blood likely contaminant  * follow the ear cx  * c/w zosyn for now  * follow with ENT    The above assessment and plan was discussed with the primary team    Linh George MD  contact on teams  After 5pm and on weekends call [phone]

## 2025-07-01 NOTE — DISCHARGE NOTE PROVIDER - NSDCQMERRANDS_GEN_ALL_CORE
Medication: triamcinolone  Last office visit date: 10/19/23  Next appointment scheduled?: no   Number of refills given: 0    No

## 2025-07-01 NOTE — DISCHARGE NOTE PROVIDER - NSDCFUADDAPPT_GEN_ALL_CORE_FT
APPTS ARE READY TO BE MADE: [X] YES    Best Family or Patient Contact (if needed):    Additional Information about above appointments (if needed):    1: Oral and Maxillofacial Surgery, in 1 week at [facility] dental clinic [phone]  2: ENT on Monday 7/7/25, Call [phone].  3:     Other comments or requests:    APPTS ARE READY TO BE MADE: [X] YES    Best Family or Patient Contact (if needed):    Additional Information about above appointments (if needed):    1: Oral and Maxillofacial Surgery, in 1 week at [facility] dental clinic [phone]  2: ENT on Monday 7/7/25, Call [phone].  3: Dr. Pierre    Other comments or requests:    APPTS ARE READY TO BE MADE: [X] YES    Best Family or Patient Contact (if needed):    Additional Information about above appointments (if needed):    1: Oral and Maxillofacial Surgery, in 1 week at [facility] dental clinic [phone]  2: ENT on Monday 7/7/25, Call [phone].  3: Dr. Pierre    Other comments or requests:   Patient was outreached but did not answer. A voicemail was left for the patient to return our call. APPTS ARE READY TO BE MADE: [X] YES    Best Family or Patient Contact (if needed):    Additional Information about above appointments (if needed):    1: Oral and Maxillofacial Surgery, in 1 week at [facility] dental clinic [phone]  2: ENT on Monday 7/7/25, Call [phone].  3: Dr. Pierre    Other comments or requests:   Patient was outreached but did not answer. A voicemail was left for the patient to return our call.    Prior to outreaching the patient, it was visible that the patient has secured a follow up appointment which was not scheduled by our team. Patient was previously scheduled for an appointment on 07/07/25 1:00PM at 	[address] [place] [address]  With Dr. Ceferino Forrest.     Patient advises they do not want our assistance and prefer to coordinate the non - [facility] appointments on their own. No information was provided to the patient.  Patient's mother, Angel, is going to schedule an appointment with Dr. Justin Akbar.    Appointment was scheduled in [facility]. Patient was scheduled for a telehealth appointment on 07/09/25 1:00PM with Dr. Jazmin Pierre.     A [facility] providers office was contacted to secure an appointment, however the office will follow up with the patient/caregiver directly. I left a message for [facility] Oral and maxillofacial surgery to call Angel to schedule Lanabilly's appointment.

## 2025-07-01 NOTE — DISCHARGE NOTE PROVIDER - CARE PROVIDER_API CALL
Justin Akbar  Pediatrics  [address] [place] [address]  Phone: [phone]  Fax: [phone]  Follow Up Time:     Gerson Alanis (III)  Otolaryngology Head And Neck Surgery  [address], [place] [address]  Phone: [phone]  Fax: [phone]  Follow Up Time:    Justin Akbar  Pediatrics  [address] [place] [address]  Phone: [phone]  Fax: [phone]  Follow Up Time:     Gerson Alanis (III)  Otolaryngology Head And Neck Surgery  [address], [place] [address]  Phone: [phone]  Fax: [phone]  Follow Up Time:     Jazmin Pierre  Internal Medicine  [address] [place] [address]  Phone: [phone]  Fax: [phone]  Follow Up Time:

## 2025-07-02 VITALS
RESPIRATION RATE: 17 BRPM | WEIGHT: 158.51 LBS | DIASTOLIC BLOOD PRESSURE: 63 MMHG | HEART RATE: 62 BPM | OXYGEN SATURATION: 99 % | SYSTOLIC BLOOD PRESSURE: 100 MMHG | TEMPERATURE: 98 F

## 2025-07-02 LAB
ANION GAP SERPL CALC-SCNC: 12 MMOL/L — SIGNIFICANT CHANGE UP (ref 7–14)
BUN SERPL-MCNC: 9 MG/DL — SIGNIFICANT CHANGE UP (ref 7–23)
CALCIUM SERPL-MCNC: 9.4 MG/DL — SIGNIFICANT CHANGE UP (ref 8.4–10.5)
CHLORIDE SERPL-SCNC: 106 MMOL/L — SIGNIFICANT CHANGE UP (ref 98–107)
CO2 SERPL-SCNC: 20 MMOL/L — LOW (ref 22–31)
CREAT SERPL-MCNC: 0.68 MG/DL — SIGNIFICANT CHANGE UP (ref 0.5–1.3)
EGFR: 128 ML/MIN/1.73M2 — SIGNIFICANT CHANGE UP
EGFR: 128 ML/MIN/1.73M2 — SIGNIFICANT CHANGE UP
GLUCOSE SERPL-MCNC: 95 MG/DL — SIGNIFICANT CHANGE UP (ref 70–99)
HCT VFR BLD CALC: 37.3 % — SIGNIFICANT CHANGE UP (ref 34.5–45)
HGB BLD-MCNC: 12.1 G/DL — SIGNIFICANT CHANGE UP (ref 11.5–15.5)
MAGNESIUM SERPL-MCNC: 2.1 MG/DL — SIGNIFICANT CHANGE UP (ref 1.6–2.6)
MCHC RBC-ENTMCNC: 28.5 PG — SIGNIFICANT CHANGE UP (ref 27–34)
MCHC RBC-ENTMCNC: 32.4 G/DL — SIGNIFICANT CHANGE UP (ref 32–36)
MCV RBC AUTO: 87.8 FL — SIGNIFICANT CHANGE UP (ref 80–100)
NRBC # BLD AUTO: 0 K/UL — SIGNIFICANT CHANGE UP (ref 0–0)
NRBC # FLD: 0 K/UL — SIGNIFICANT CHANGE UP (ref 0–0)
NRBC BLD AUTO-RTO: 0 /100 WBCS — SIGNIFICANT CHANGE UP (ref 0–0)
PHOSPHATE SERPL-MCNC: 4.1 MG/DL — SIGNIFICANT CHANGE UP (ref 2.5–4.5)
PLATELET # BLD AUTO: 362 K/UL — SIGNIFICANT CHANGE UP (ref 150–400)
PMV BLD: 10.5 FL — SIGNIFICANT CHANGE UP (ref 7–13)
POTASSIUM SERPL-MCNC: 3.8 MMOL/L — SIGNIFICANT CHANGE UP (ref 3.5–5.3)
POTASSIUM SERPL-SCNC: 3.8 MMOL/L — SIGNIFICANT CHANGE UP (ref 3.5–5.3)
RBC # BLD: 4.25 M/UL — SIGNIFICANT CHANGE UP (ref 3.8–5.2)
RBC # FLD: 13.6 % — SIGNIFICANT CHANGE UP (ref 10.3–14.5)
SODIUM SERPL-SCNC: 138 MMOL/L — SIGNIFICANT CHANGE UP (ref 135–145)
WBC # BLD: 10.76 K/UL — HIGH (ref 3.8–10.5)
WBC # FLD AUTO: 10.76 K/UL — HIGH (ref 3.8–10.5)

## 2025-07-02 PROCEDURE — 99232 SBSQ HOSP IP/OBS MODERATE 35: CPT

## 2025-07-02 PROCEDURE — 99239 HOSP IP/OBS DSCHRG MGMT >30: CPT

## 2025-07-02 PROCEDURE — G0545: CPT

## 2025-07-02 RX ORDER — CIPROFLOXACIN HCL 250 MG
1 TABLET ORAL
Qty: 36 | Refills: 0
Start: 2025-07-02 | End: 2025-07-19

## 2025-07-02 RX ORDER — DEXAMETHASONE 1 MG/ML
5 SUSPENSION OPHTHALMIC
Qty: 1 | Refills: 0
Start: 2025-07-02

## 2025-07-02 RX ORDER — AMOXICILLIN AND CLAVULANATE POTASSIUM 500; 125 MG/1; MG/1
1 TABLET, FILM COATED ORAL
Qty: 36 | Refills: 0
Start: 2025-07-02 | End: 2025-07-19

## 2025-07-02 RX ORDER — CIPROFLOXACIN HCL 250 MG
1 TABLET ORAL
Refills: 0 | DISCHARGE

## 2025-07-02 RX ORDER — IBUPROFEN 200 MG
1 TABLET ORAL
Qty: 0 | Refills: 0 | DISCHARGE

## 2025-07-02 RX ADMIN — Medication 25 GRAM(S): at 09:13

## 2025-07-02 RX ADMIN — Medication 25 GRAM(S): at 01:57

## 2025-07-02 RX ADMIN — Medication 5 DROP(S): at 07:36

## 2025-07-02 RX ADMIN — Medication 1 APPLICATION(S): at 11:25

## 2025-07-02 NOTE — DISCHARGE NOTE NURSING/CASE MANAGEMENT/SOCIAL WORK - PATIENT PORTAL LINK FT
You can access the FollowMyHealth Patient Portal offered by [facility] by registering at the following website: [email or web address]. By joining [facility]’s FollowMyHealth portal, you will also be able to view your health information using other applications (apps) compatible with our system.

## 2025-07-02 NOTE — PROGRESS NOTE ADULT - ASSESSMENT
19 y.o. Female  smoker no medical history, prior otitis externa who presents w/ worsening R ear pain.
A/P: 19yF with right ear pain diagnosed with otitis externa at outside hospital and transferred for further care. Right ear debrided at bedside and wick placed for better access of ciprodex drops.    - continue ciprodex drops 5 drops BID for 10 days  - IV abx: Zosyn  - f/u culture  - f/u OMFS  - dry ear precautions in right ear until infection resolution
A/P: 19yF with right ear pain diagnosed with otitis externa at outside hospital and transferred for further care. Right ear debrided at bedside and wick placed for better access of ciprodex drops.    - continue ciprodex drops 5 drops BID for 10 days  - IV abx: Zosyn  - f/u culture  - f/u OMFS  - dry ear precautions in right ear until infection resolution  - if planned for dc, can dc home w ciprodex drops and fu outpatient with ENT in 2-3 days
19yF with right ear pain diagnosed with otitis externa at outside hospital and transferred to [facility] for further care. Patient reports feeling much improved, no longer endorsing R ear pain. Right ear debrided at bedside, EAC still inflamed, wick placed for better access of ciprodex drops. Patient remains on zosyn. Culture 6/29 with no growth. WBC continues to downtrend, 10.76 today.    - No contraindication to discharge from ENT perspective at this time  - Upon discharge transition to oral antibiotics Augmentin x7 days and send home on ciprodex drops 5 drops BID to R ear for 10 days  - Dry ear precautions in right ear until infection resolution  - Ensure patient calls for follow up outpatient with ENT on Monday 7/7 for wick removal: [phone].  - Discussed with Dr. Alanis  
19 f with prior otitis externa, went swimming in the beach on 6/23 and on 6/25 noted R ear discomfort then worsening swelling, drainage and difficulty opening the mouth. On 6/27 went to urgent care given neomycin/polymyxin/hydrocort and PO cipro which she took about 2 days but developed nausea, vomiting   here afebrile tmax in 99s  WBC initially 15  auditory CT showed R otitis externa.  No discrete abscess or drainable fluid collection, asymmetric subluxation of the right temporomandibular joint with asymmetric hyperattenuation in the right temporomandibular joint, which raises the possibility of septic arthritis. No gross CT evidence for intracranial spread of infection.  maxillofacial CT showed R sided otitis externa and otomastoiditis. No associated bony or ossicular chain destruction.  Localized inflammatory changes tracking into the right TMJ space   without bony destruction. Right-sided TMJ septic arthritis cannot be excluded. . Reactive right-sided parotiditis.  Reactively enlarged bilateral cervical lymph nodes.  TMJ MRI showed Articular disc remains appropriate in position. With mouth opening there is asymmetric limited anterior translation. Inflammatory changes in the overlying soft tissues are not well evaluated by this technique. Right mastoid effusion. No significant temporomandibular joint effusion is recognized. Follow-up evaluation with gadolinium-enhanced images may be considered, as clinically warranted  s/p dbridement and wick by ENT but cx negative  blood cx: 1/4 staph epi likely contaminant  now improving on zosyn    leukocytosis, R otitis externa and otomastoiditis with no bony destruction and in intracranial extention s/p wick but cx negative  there was concern for subluxation of R TMJ and septic arthritis but the TMJ MRI did not show subluxation and no joint effusion just inflammatory changes tracking into R TMJ space  1/4 staph epi in the blood likely contaminant    * c/w zosyn while in the hospital, started 6/29 now day 4  * pt clinically improved, on discharge switch to PO agumentin 875 bid and cipro 750 q 12 to complete a total 3 weeks  * follow with ENT    The above assessment and plan was discussed with the primary team    Linh George MD  contact on teams  After 5pm and on weekends call [phone]    
19F w/ no pmhx presents as a transfer from [facility] ED to [facility] for ENT eval. Repeat max face shows Patient with otitis media and otitis externa, repeat w/  localizing inflammatory changes that tracks into the right TMJ space, no bony destruction or collection appreciated. Patient is now presenting with significant clinical improvement     Plan:   - no need to repeat MRI  - No Further OMFS intervention  - F/U OMFS 1 week at [facility] dental clinic [phone]      Ramiro Ty  Oral and Maxillofacial Surgery  [facility] OMFS Pager #[phone]  [facility] Pager: [phone]  Available on Teams
19 y.o. Female  smoker no medical history, prior otitis externa who presents w/ worsening R ear pain.
19 y.o. Female  smoker no medical history, prior otitis externa who presents w/ worsening R ear pain.

## 2025-07-02 NOTE — DISCHARGE NOTE NURSING/CASE MANAGEMENT/SOCIAL WORK - NSDCFUADDAPPT_GEN_ALL_CORE_FT
APPTS ARE READY TO BE MADE: [X] YES    Best Family or Patient Contact (if needed):    Additional Information about above appointments (if needed):    1: Oral and Maxillofacial Surgery, in 1 week at [facility] dental clinic [phone]  2: ENT on Monday 7/7/25, Call [phone].  3: Dr. Pierre    Other comments or requests:

## 2025-07-02 NOTE — PROGRESS NOTE ADULT - SUBJECTIVE AND OBJECTIVE BOX
Follow Up:  otomastoiditis    Interval History/ROS: pt afebrile, improved ear pain and edema, no headache or nausea           Allergies  No Known Allergies        ANTIMICROBIALS:  piperacillin/tazobactam IVPB.. 3.375 every 8 hours      OTHER MEDS:  acetaminophen     Tablet .. 650 milliGRAM(s) Oral every 6 hours PRN  aluminum hydroxide/magnesium hydroxide/simethicone Suspension 30 milliLiter(s) Oral every 4 hours PRN  chlorhexidine 2% Cloths 1 Application(s) Topical daily  ciprofloxacin  0.3% Ophthalmic Solution for OTIC Use 5 Drop(s) Right Ear two times a day  dexAMETHasone 0.1% Ophthalmic Solution for OTIC Use 5 Drop(s) Right Ear two times a day  ketorolac   Injectable 15 milliGRAM(s) IV Push every 6 hours PRN  melatonin 3 milliGRAM(s) Oral at bedtime PRN  ondansetron Injectable 4 milliGRAM(s) IV Push every 8 hours PRN      Vital Signs Last 24 Hrs  T(C): 36.4 (02 Jul 2025 05:38), Max: 36.9 (01 Jul 2025 21:25)  T(F): 97.6 (02 Jul 2025 05:38), Max: 98.5 (01 Jul 2025 21:25)  HR: 62 (02 Jul 2025 05:38) (62 - 66)  BP: 100/63 (02 Jul 2025 05:38) (100/62 - 114/64)  BP(mean): --  RR: 17 (02 Jul 2025 05:38) (16 - 17)  SpO2: 99% (02 Jul 2025 05:38) (99% - 100%)    Parameters below as of 02 Jul 2025 05:38  Patient On (Oxygen Delivery Method): room air        Physical Exam:  General:    NAD, non toxic  ENT:  improved R ear edema and tenderness no mastoid tenderness, can open the mouth fully  Respiratory:   comfortable on RA  :   no palmer  Musculoskeletal : no joint swelling, no edema  Skin:    no rash  vascular: no phlebitis             12.1   10.76 )-----------( 362      ( 02 Jul 2025 06:05 )             37.3       07-02    138  |  106  |  9   ----------------------------<  95  3.8   |  20[L]  |  0.68    Ca    9.4      02 Jul 2025 06:05  Phos  4.1     07-02  Mg     2.10     07-02        Urinalysis Basic - ( 02 Jul 2025 06:05 )    Color: x / Appearance: x / SG: x / pH: x  Gluc: 95 mg/dL / Ketone: x  / Bili: x / Urobili: x   Blood: x / Protein: x / Nitrite: x   Leuk Esterase: x / RBC: x / WBC x   Sq Epi: x / Non Sq Epi: x / Bacteria: x        MICROBIOLOGY:  v  Ear Ear  06-29-25   No growth  --    polymorphonuclear leukocytes seen  No organisms seen  by cytocentrifuge      Blood Blood-Peripheral  06-29-25   No growth at 48 Hours  --  --      Blood Blood  06-29-25   Growth in anaerobic bottle: Staphylococcus epidermidis  Isolation of Coagulase negative Staphylococcus from single blood culture  sets may represent  contamination. Contact the Microbiology Department at [phone] if  susceptibility testing is needed.  clinically indicated.  Direct identification is available within approximately 3-5  hours either by Blood Panel Multiplexed PCR or Direct  MALDI-TOF. Details: [email or web address]  --  Blood Culture PCR                RADIOLOGY:  Images independently visualized and reviewed personally, findings as below  < from: MR Temporomandibular Joints (TMJ) (06.30.25 @ 11:48) >    IMPRESSION:    1.  RIGHT TMJ:   Articular disc remains appropriate in position. With   mouth opening there is asymmetric limited anterior translation.   Inflammatory changes in the overlying soft tissues are not well evaluated   by this technique. Right mastoid effusion. No significant   temporomandibular joint effusion is recognized. Follow-up evaluation with   gadolinium-enhanced images may be considered, as clinically warranted    2.  LEFT TMJ:   Intact without significant arthropathy.  Articular disc   remains appropriate in position and with mouth opening demonstrates   physiologic translation.    3. Asymmetrically numerous predominantly small right neck lymph nodes,   evaluation limited by anatomic coverage of this study    < end of copied text >

## 2025-07-02 NOTE — DISCHARGE NOTE NURSING/CASE MANAGEMENT/SOCIAL WORK - FINANCIAL ASSISTANCE
[facility] provides services at a reduced cost to those who are determined to be eligible through [facility]’s financial assistance program. Information regarding [facility]’s financial assistance program can be found by going to [email or web address] or by calling [phone].

## 2025-07-02 NOTE — PROGRESS NOTE ADULT - TIME BILLING
Reviewed lab data, radiology results, consultants' recommendations, documentation in [place], discussed with family, ACP, interdisciplinary staff and/or intervention were performed.
Reviewed lab data, radiology results, consultants' recommendations, documentation in [place], discussed with family, ACP, interdisciplinary staff and/or intervention were performed.
Reviewed lab data, radiology results, consultants' recommendations, documentation in [place], discussed with family, ACP, interdisciplinary staff and/or intervention were performed.

## 2025-07-02 NOTE — PROGRESS NOTE ADULT - PROBLEM SELECTOR PLAN 1
- c/w cipro and dexamethasone ear drops  -c/w Zosyn to cover pseudomonas  -F/U ENT recs  - avoid water/moisture in ear  -ear pam Pelayo NTD
- c/w cipro and dexamethasone ear drops  -c/w Zosyn to cover pseudomonas  -F/U ENT recs  - avoid water/moisture in ear
- c/w cipro and dexamethasone ear drops  -on Zosyn D#4 to cover pseudomonas  -switch to PO augmentin 875 bid and cipro 750 q 12 to complete a total 3 weeks as per ID  -c/w ciprodex drops 5 drops BID to R ear for 10 days as per ENT  - follow up outpatient with ENT on Monday 7/7 for wick removal: [phone].  -ear fluid Cx NTD

## 2025-07-02 NOTE — PROGRESS NOTE ADULT - SUBJECTIVE AND OBJECTIVE BOX
Patient is a 19y old  Female who presents with a chief complaint of R ear pain (02 Jul 2025 12:25)      SUBJECTIVE / OVERNIGHT EVENTS:  Patient has no new complaints. Denies cp, SOB, abdominal pain, N/V/D     MEDICATIONS  (STANDING):  chlorhexidine 2% Cloths 1 Application(s) Topical daily  ciprofloxacin  0.3% Ophthalmic Solution for OTIC Use 5 Drop(s) Right Ear two times a day  dexAMETHasone 0.1% Ophthalmic Solution for OT[facility] Use 5 Drop(s) Right Ear two times a day  piperacillin/tazobactam IVPB.. 3.375 Gram(s) IV Intermittent every 8 hours    MEDICATIONS  (PRN):  acetaminophen     Tablet .. 650 milliGRAM(s) Oral every 6 hours PRN Temp greater or equal to 38C (100.4F), Mild Pain (1 - 3)  aluminum hydroxide/magnesium hydroxide/simethicone Suspension 30 milliLiter(s) Oral every 4 hours PRN Dyspepsia  ketorolac   Injectable 15 milliGRAM(s) IV Push every 6 hours PRN moderate and severe pain  melatonin 3 milliGRAM(s) Oral at bedtime PRN Insomnia  ondansetron Injectable 4 milliGRAM(s) IV Push every 8 hours PRN Nausea and/or Vomiting      Vital Signs Last 24 Hrs  T(C): 36.4 (02 Jul 2025 05:38), Max: 36.9 (01 Jul 2025 21:25)  T(F): 97.6 (02 Jul 2025 05:38), Max: 98.5 (01 Jul 2025 21:25)  HR: 62 (02 Jul 2025 05:38) (62 - 66)  BP: 100/63 (02 Jul 2025 05:38) (100/62 - 114/64)  BP(mean): --  RR: 17 (02 Jul 2025 05:38) (16 - 17)  SpO2: 99% (02 Jul 2025 05:38) (99% - 100%)    Parameters below as of 02 Jul 2025 05:38  Patient On (Oxygen Delivery Method): room air      CAPILLARY BLOOD GLUCOSE        I&O's Summary    01 Jul 2025 07:01  -  02 Jul 2025 07:00  --------------------------------------------------------  IN: 870 mL / OUT: 250 mL / NET: 620 mL    02 Jul 2025 07:01  -  02 Jul 2025 13:24  --------------------------------------------------------  IN: 0 mL / OUT: 300 mL / NET: -300 mL        PHYSICAL EXAM:  GENERAL: NAD  HEAD:  Atraumatic, Normocephalic, R ear drainage, TTP of mastoid and TMJ + wick  EYES: EOMI, PERRLA, conjunctiva and sclera clear  NECK: Supple, No JVD  CHEST/LUNG: Clear to auscultation bilaterally; No wheeze  HEART: Regular rate and rhythm; No murmurs, rubs, or gallops  ABDOMEN: Soft, Nontender, Nondistended; Bowel sounds present  EXTREMITIES:  2+ Peripheral Pulses, No clubbing, cyanosis, or edema  PSYCH: AAOx3  NEUROLOGY: non-focal  SKIN: No rashes or lesions    LABS:                        12.1   10.76 )-----------( 362      ( 02 Jul 2025 06:05 )             37.3     07-02    138  |  106  |  9   ----------------------------<  95  3.8   |  20[L]  |  0.68    Ca    9.4      02 Jul 2025 06:05  Phos  4.1     07-02  Mg     2.10     07-02            Urinalysis Basic - ( 02 Jul 2025 06:05 )    Color: x / Appearance: x / SG: x / pH: x  Gluc: 95 mg/dL / Ketone: x  / Bili: x / Urobili: x   Blood: x / Protein: x / Nitrite: x   Leuk Esterase: x / RBC: x / WBC x   Sq Epi: x / Non Sq Epi: x / Bacteria: x        RADIOLOGY & ADDITIONAL TESTS:    Imaging Personally Reviewed:    Consultant(s) Notes Reviewed:      Care Discussed with Consultants/Other Providers:

## 2025-07-04 LAB
CULTURE RESULTS: SIGNIFICANT CHANGE UP
CULTURE RESULTS: SIGNIFICANT CHANGE UP
SPECIMEN SOURCE: SIGNIFICANT CHANGE UP
SPECIMEN SOURCE: SIGNIFICANT CHANGE UP